# Patient Record
Sex: MALE | Race: WHITE | NOT HISPANIC OR LATINO | Employment: OTHER | URBAN - METROPOLITAN AREA
[De-identification: names, ages, dates, MRNs, and addresses within clinical notes are randomized per-mention and may not be internally consistent; named-entity substitution may affect disease eponyms.]

---

## 2017-05-04 ENCOUNTER — HOSPITAL ENCOUNTER (OUTPATIENT)
Dept: LAB | Facility: HOSPITAL | Age: 77
Discharge: HOME | End: 2017-05-04
Attending: INTERNAL MEDICINE | Admitting: INTERNAL MEDICINE

## 2017-05-04 LAB
MAGNESIUM SERPL-MCNC: 1.8 MG/DL (ref 1.8–2.5)
TSH SERPL-ACNC: 1.34 UIU/ML (ref 0.34–5.6)

## 2017-05-10 ENCOUNTER — HOSPITAL ENCOUNTER (OUTPATIENT)
Dept: CARDIOLOGY | Facility: HOSPITAL | Age: 77
Discharge: HOME | End: 2017-05-10
Attending: INTERNAL MEDICINE | Admitting: INTERNAL MEDICINE

## 2017-05-15 ENCOUNTER — HOSPITAL ENCOUNTER (OUTPATIENT)
Dept: PREADMISSION TESTING | Facility: HOSPITAL | Age: 77
Discharge: HOME | End: 2017-05-15
Attending: INTERNAL MEDICINE | Admitting: INTERNAL MEDICINE

## 2017-05-15 LAB
ANION GAP SERPL CALC-SCNC: 15.4 MMOL/L (ref 10–20)
BASOPHILS # BLD AUTO: 0.1 10*3/UL (ref 0–0.2)
BASOPHILS NFR BLD AUTO: 1 % (ref 0–2)
BUN SERPL-MCNC: 16 MG/DL (ref 8–20)
BUN/CREAT SERPL: 16 (ref 6.2–20.3)
CALCIUM SERPL-MCNC: 9.6 MG/DL (ref 8.9–10.3)
CHLORIDE SERPL-SCNC: 104 MMOL/L (ref 101–111)
CHOLEST SERPL-MCNC: 108 MG/DL
CHOLEST/HDLC SERPL: 2.7 {RATIO}
CONV CO2: 26 MMOL/L (ref 22–32)
CONV LDL CHOLESTEROL DIRECT: 39 MG/DL (ref 0–100)
CREAT UR-MCNC: 1 MG/DL (ref 0.7–1.2)
DIFFERENTIAL METHOD BLD: (no result)
EOSINOPHIL # BLD AUTO: 0.1 10*3/UL (ref 0–0.3)
EOSINOPHIL # BLD AUTO: 1 % (ref 0–3)
ERYTHROCYTE [DISTWIDTH] IN BLOOD BY AUTOMATED COUNT: 14.3 % (ref 11.5–14.5)
GLUCOSE SERPL-MCNC: 111 MG/DL (ref 65–99)
HCT VFR BLD AUTO: 42.4 % (ref 40–54)
HDLC SERPL-MCNC: 40 MG/DL
HGB BLD-MCNC: 14.3 G/DL (ref 14–18)
INR PPP: 1
LDLC/HDLC SERPL: 1 {RATIO}
LIPID INTERPRETATION: ABNORMAL
LYMPHOCYTES # BLD AUTO: 1.8 10*3/UL (ref 0.8–4.8)
LYMPHOCYTES NFR BLD AUTO: 29 % (ref 18–42)
MCH RBC QN AUTO: 32.1 PG (ref 26–32)
MCHC RBC AUTO-ENTMCNC: 33.8 G/DL (ref 32–36)
MCV RBC AUTO: 95 FL (ref 80–94)
MONOCYTES # BLD AUTO: 0.5 10*3/UL (ref 0.1–1.3)
MONOCYTES NFR BLD AUTO: 9 % (ref 2–11)
NEUTROPHILS # BLD AUTO: 3.7 10*3/UL (ref 2.3–8.6)
NEUTROPHILS NFR BLD AUTO: 60 % (ref 50–75)
NRBC BLD AUTO-RTO: 0 /100{WBCS}
NRBC/RBC NFR BLD MANUAL: 0 10*3/UL
PLATELET # BLD AUTO: 123 10*3/UL (ref 150–450)
PMV BLD AUTO: 9.9 FL (ref 7.4–10.4)
POTASSIUM SERPL-SCNC: 4.4 MMOL/L (ref 3.6–5.1)
PROTHROMBIN TIME: 11.7 SEC (ref 9.6–11.7)
RBC # BLD AUTO: 4.46 10*6/UL (ref 4.6–6)
SODIUM SERPL-SCNC: 141 MMOL/L (ref 136–144)
TRIGL SERPL-MCNC: 171 MG/DL
VLDLC SERPL CALC-MCNC: 28.7 MG/DL
WBC # BLD AUTO: 6.2 10*3/UL (ref 4.5–11.5)

## 2017-05-23 ENCOUNTER — HOSPITAL ENCOUNTER (OUTPATIENT)
Dept: OTHER | Facility: HOSPITAL | Age: 77
Discharge: HOME | End: 2017-05-23
Attending: INTERNAL MEDICINE | Admitting: INTERNAL MEDICINE

## 2017-05-23 LAB
ANION GAP SERPL CALC-SCNC: 14.1 MMOL/L (ref 10–20)
APTT BLD: 23.8 SEC (ref 24–31)
BACTERIA SPEC AEROBE CULT: NORMAL
BASOPHILS # BLD AUTO: 0.1 10*3/UL (ref 0–0.2)
BASOPHILS NFR BLD AUTO: 1 % (ref 0–2)
BUN SERPL-MCNC: 16 MG/DL (ref 8–20)
BUN/CREAT SERPL: 17.8 (ref 6.2–20.3)
CALCIUM SERPL-MCNC: 9.5 MG/DL (ref 8.9–10.3)
CHLORIDE SERPL-SCNC: 107 MMOL/L (ref 101–111)
CONV CO2: 26 MMOL/L (ref 22–32)
CREAT UR-MCNC: 0.9 MG/DL (ref 0.7–1.2)
DIFFERENTIAL METHOD BLD: (no result)
EOSINOPHIL # BLD AUTO: 0.1 10*3/UL (ref 0–0.3)
EOSINOPHIL # BLD AUTO: 2 % (ref 0–3)
ERYTHROCYTE [DISTWIDTH] IN BLOOD BY AUTOMATED COUNT: 14.2 % (ref 11.5–14.5)
GLUCOSE SERPL-MCNC: 106 MG/DL (ref 65–99)
HCT VFR BLD AUTO: 40.7 % (ref 40–54)
HGB BLD-MCNC: 13.8 G/DL (ref 14–18)
INR PPP: 1
LYMPHOCYTES # BLD AUTO: 1.5 10*3/UL (ref 0.8–4.8)
LYMPHOCYTES NFR BLD AUTO: 28 % (ref 18–42)
Lab: NORMAL
MCH RBC QN AUTO: 32 PG (ref 26–32)
MCHC RBC AUTO-ENTMCNC: 33.8 G/DL (ref 32–36)
MCV RBC AUTO: 94.8 FL (ref 80–94)
MICRO REPORT STATUS: NORMAL
MONOCYTES # BLD AUTO: 0.5 10*3/UL (ref 0.1–1.3)
MONOCYTES NFR BLD AUTO: 9 % (ref 2–11)
NEUTROPHILS # BLD AUTO: 3.3 10*3/UL (ref 2.3–8.6)
NEUTROPHILS NFR BLD AUTO: 60 % (ref 50–75)
NRBC BLD AUTO-RTO: 0 /100{WBCS}
NRBC/RBC NFR BLD MANUAL: 0 10*3/UL
PLATELET # BLD AUTO: 128 10*3/UL (ref 150–450)
PMV BLD AUTO: 9.9 FL (ref 7.4–10.4)
POTASSIUM SERPL-SCNC: 4.1 MMOL/L (ref 3.6–5.1)
PROTHROMBIN TIME: 11.4 SEC (ref 9.6–11.7)
RBC # BLD AUTO: 4.29 10*6/UL (ref 4.6–6)
SODIUM SERPL-SCNC: 143 MMOL/L (ref 136–144)
SPECIMEN SOURCE: NORMAL
WBC # BLD AUTO: 5.5 10*3/UL (ref 4.5–11.5)

## 2019-08-15 ENCOUNTER — CLINICAL SUPPORT NO REQUIREMENTS (OUTPATIENT)
Dept: CARDIOLOGY | Facility: CLINIC | Age: 79
End: 2019-08-15

## 2019-08-15 DIAGNOSIS — R00.1 BRADYCARDIA: Primary | ICD-10-CM

## 2019-08-15 DIAGNOSIS — Z95.0 PACEMAKER: ICD-10-CM

## 2019-08-22 PROCEDURE — 93296 REM INTERROG EVL PM/IDS: CPT | Performed by: INTERNAL MEDICINE

## 2019-08-22 PROCEDURE — 93294 REM INTERROG EVL PM/LDLS PM: CPT | Performed by: INTERNAL MEDICINE

## 2019-08-26 ENCOUNTER — TELEPHONE (OUTPATIENT)
Dept: CARDIOLOGY | Facility: CLINIC | Age: 79
End: 2019-08-26

## 2019-08-26 NOTE — TELEPHONE ENCOUNTER
Wants call from Rita, had a couple of episodes lately, almost passed out, b/p 158/90 on Saturday, wants Rita to check pacemaker around 3 pm and 8 pm.  Told her I would send message to Dr. Rodrigez's medical assistance to address the near passing out

## 2019-08-29 ENCOUNTER — CLINICAL SUPPORT NO REQUIREMENTS (OUTPATIENT)
Dept: CARDIOLOGY | Facility: CLINIC | Age: 79
End: 2019-08-29

## 2019-08-29 DIAGNOSIS — Z95.0 PACEMAKER: ICD-10-CM

## 2019-08-29 DIAGNOSIS — R00.1 BRADYCARDIA, UNSPECIFIED: Primary | ICD-10-CM

## 2019-08-29 NOTE — PROGRESS NOTES
8/29/19  Did not contact pt for this report.  Wife called earlier today-new transmission sent. Mariel reviewed/discussed new report earlier today.

## 2019-08-29 NOTE — TELEPHONE ENCOUNTER
Spoke to wife, patient had dizzy episodes about the time of the fast A/V rates on 8/24/2019. 16:27 14 sec 139 hr and 20:04 11 sec 140 hr. Recommended he drink plenty of fluids, may be due to slight dehydration. Continue to monitor, Dr Rodrigez to advise.

## 2019-08-29 NOTE — TELEPHONE ENCOUNTER
Call from wife stating transmitter will not complete cycle - keeps flashing.    Advised to contact [employer] at [phone]. Verbal understanding, but still wants Mariel to return call...

## 2019-09-11 RX ORDER — LEVOFLOXACIN 750 MG/1
TABLET ORAL
COMMUNITY
Start: 2019-07-18 | End: 2020-11-30

## 2019-09-11 RX ORDER — TAMSULOSIN HYDROCHLORIDE 0.4 MG/1
2 CAPSULE ORAL NIGHTLY
COMMUNITY
Start: 2019-08-23

## 2019-09-11 RX ORDER — SIMVASTATIN 40 MG
40 TABLET ORAL NIGHTLY
COMMUNITY
Start: 2019-08-11

## 2019-09-11 RX ORDER — FINASTERIDE 5 MG/1
5 TABLET, FILM COATED ORAL DAILY
COMMUNITY
Start: 2019-08-23

## 2019-09-11 RX ORDER — DOCUSATE SODIUM 100 MG/1
100 CAPSULE, LIQUID FILLED ORAL
COMMUNITY
Start: 2017-05-22

## 2019-09-11 RX ORDER — ASPIRIN 81 MG/1
TABLET ORAL
COMMUNITY
Start: 2015-09-10 | End: 2020-11-30

## 2019-09-12 ENCOUNTER — OFFICE VISIT (OUTPATIENT)
Dept: CARDIOLOGY | Facility: CLINIC | Age: 79
End: 2019-09-12

## 2019-09-12 VITALS
HEART RATE: 63 BPM | HEIGHT: 72 IN | WEIGHT: 205.5 LBS | BODY MASS INDEX: 27.83 KG/M2 | SYSTOLIC BLOOD PRESSURE: 131 MMHG | DIASTOLIC BLOOD PRESSURE: 83 MMHG | OXYGEN SATURATION: 100 %

## 2019-09-12 DIAGNOSIS — I49.3 PVC'S (PREMATURE VENTRICULAR CONTRACTIONS): ICD-10-CM

## 2019-09-12 DIAGNOSIS — R42 DIZZINESS: Primary | ICD-10-CM

## 2019-09-12 DIAGNOSIS — I10 ESSENTIAL HYPERTENSION: ICD-10-CM

## 2019-09-12 DIAGNOSIS — Z95.0 STATUS POST PLACEMENT OF CARDIAC PACEMAKER: ICD-10-CM

## 2019-09-12 DIAGNOSIS — E78.5 DYSLIPIDEMIA: ICD-10-CM

## 2019-09-12 PROCEDURE — 99214 OFFICE O/P EST MOD 30 MIN: CPT | Performed by: INTERNAL MEDICINE

## 2019-09-12 NOTE — PROGRESS NOTES
Encounter Date:09/12/2019  Last seen 5/8/2019      Patient ID: Herve Pérez Sr. is a 79 y.o. male.    Chief Complaint:  Acute visit-dizziness  Patient was last seen in the office in May 2019.  Recently patient had dizziness for couple days approximately 3 to 4 weeks ago.  Dizziness has improved.  Since I have last seen, the patient has been without any chest discomfort ,shortness of breath, palpitations, or syncope.  Denies having any headache ,abdominal pain ,nausea, vomiting , diarrhea constipation, loss of weight or loss of appetite.  Denies having any excessive bruising ,hematuria or blood in the stool.  Review of all systems negative except as indicated    History of Present Illness      Assessment and Plan       [[[[  Impression  ====  -status permanent dual-chamber pacemaker implantation (Medtronic MRI compatible) 05/24/2017.  Tachy-ruy syndrome     - premature ventricle contractions.-    -Cardiac catheterization 05/17/2017 revealed mild anterior wall hypokinesis and normal coronary arteries.  Ejection fraction 55-60%.    Echocardiogram 05/10/2017 revealed left atrial enlargement thickened aortic valve and proximal posterior wall hypokinesis.  Stress Cardiolite test is abnormal 05/10/2017 with poster lateral ischemia.  Patient had increased ventricular ectopy with couplets.    - strong family history of coronary artery disease     -dyslipidemia and hypertension    -mild lower extremity edema probably secondary to varicose veins     -former smoker     -allergy to Augmentin.  ====    Plan  ===  Dizziness couple of weeks back.  Dizziness has improved.  Patient is not having any angina pectoris or congestive heart failure.  Patient has premature ventricle contractions  continue Lopressor 25 mg  tablet half a tablet twice a day.  Followup in the office at her regularly scheduled appointment with pacemaker interrogation.  [[[[           Diagnosis Plan   1. Dizziness     2. Status post placement of cardiac  pacemaker     3. PVC's (premature ventricular contractions)     4. Essential hypertension     5. Dyslipidemia     LAB RESULTS (LAST 7 DAYS)    CBC        BMP        CMP         BNP        TROPONIN        CoAg        Creatinine Clearance  CrCl cannot be calculated (Patient's most recent lab result is older than the maximum 30 days allowed.).    ABG        Radiology  No radiology results for the last day                The following portions of the patient's history were reviewed and updated as appropriate: allergies, current medications, past family history, past medical history, past social history, past surgical history and problem list.    Review of Systems   Constitution: Positive for malaise/fatigue.   Cardiovascular: Negative for chest pain, leg swelling, palpitations and syncope.   Respiratory: Negative for shortness of breath.    Skin: Negative for rash.   Gastrointestinal: Negative for nausea and vomiting.   Neurological: Positive for dizziness and light-headedness. Negative for numbness.         Current Outpatient Medications:   •  apixaban (ELIQUIS) 5 MG tablet tablet, Every 12 (Twelve) Hours., Disp: , Rfl:   •  docusate sodium (CVS STOOL SOFTENER) 100 MG capsule, CVS STOOL SOFTENER 100 MG CAPS, Disp: , Rfl:   •  finasteride (PROSCAR) 5 MG tablet, , Disp: , Rfl:   •  metoprolol tartrate (LOPRESSOR) 25 MG tablet, , Disp: , Rfl:   •  simvastatin (ZOCOR) 40 MG tablet, , Disp: , Rfl:   •  tamsulosin (FLOMAX) 0.4 MG capsule 24 hr capsule, , Disp: , Rfl:   •  aspirin (ASPIR-LOW) 81 MG EC tablet, ASPIR-LOW 81 MG TBEC, Disp: , Rfl:   •  levoFLOXacin (LEVAQUIN) 750 MG tablet, , Disp: , Rfl:     Allergies   Allergen Reactions   • Amoxicillin-Pot Clavulanate Unknown (See Comments)       Family History   Problem Relation Age of Onset   • Heart disease Mother    • Heart disease Sister    • Heart disease Brother        Past Surgical History:   Procedure Laterality Date   • BACK SURGERY     • CARDIAC CATHETERIZATION    "  • CATARACT EXTRACTION     • PACEMAKER IMPLANTATION         Past Medical History:   Diagnosis Date   • Hyperlipidemia    • Hypertension        Family History   Problem Relation Age of Onset   • Heart disease Mother    • Heart disease Sister    • Heart disease Brother        Social History     Socioeconomic History   • Marital status:      Spouse name: Not on file   • Number of children: Not on file   • Years of education: Not on file   • Highest education level: Not on file   Tobacco Use   • Smoking status: Former Smoker   • Smokeless tobacco: Never Used   Substance and Sexual Activity   • Alcohol use: Yes     Comment: rare; beer         Procedures      Objective:       Physical Exam    /83 (BP Location: Left arm, Patient Position: Sitting, Cuff Size: Adult)   Pulse 63   Ht 181.6 cm (71.5\")   Wt 93.2 kg (205 lb 8 oz)   SpO2 100%   BMI 28.26 kg/m²   The patient is alert, oriented and in no distress.    Vital signs as noted above.    Head and neck revealed no carotid bruits or jugular venous distension.  No thyromegaly or lymphadenopathy is present.    Lungs clear.  No wheezing.  Breath sounds are normal bilaterally.    Heart normal first and second heart sounds.  No murmur..  No pericardial rub is present.  No gallop is present.    Abdomen soft and nontender.  No organomegaly is present.    Extremities revealed good peripheral pulses without any pedal edema.    Skin warm and dry.  Pacemaker site looks normal.    Musculoskeletal system is grossly normal.    CNS grossly normal.        "

## 2019-11-20 ENCOUNTER — OFFICE VISIT (OUTPATIENT)
Dept: CARDIOLOGY | Facility: CLINIC | Age: 79
End: 2019-11-20

## 2019-11-20 ENCOUNTER — CLINICAL SUPPORT NO REQUIREMENTS (OUTPATIENT)
Dept: CARDIOLOGY | Facility: CLINIC | Age: 79
End: 2019-11-20

## 2019-11-20 VITALS
OXYGEN SATURATION: 98 % | BODY MASS INDEX: 28.27 KG/M2 | DIASTOLIC BLOOD PRESSURE: 82 MMHG | SYSTOLIC BLOOD PRESSURE: 136 MMHG | HEIGHT: 72 IN | HEART RATE: 63 BPM | WEIGHT: 208.75 LBS

## 2019-11-20 DIAGNOSIS — I10 ESSENTIAL HYPERTENSION: ICD-10-CM

## 2019-11-20 DIAGNOSIS — E78.5 DYSLIPIDEMIA: ICD-10-CM

## 2019-11-20 DIAGNOSIS — Z95.0 STATUS POST PLACEMENT OF CARDIAC PACEMAKER: ICD-10-CM

## 2019-11-20 DIAGNOSIS — Z95.0 PACEMAKER: Primary | ICD-10-CM

## 2019-11-20 DIAGNOSIS — R00.1 BRADYCARDIA, SINUS: ICD-10-CM

## 2019-11-20 DIAGNOSIS — R00.1 BRADYCARDIA, SINUS: Primary | ICD-10-CM

## 2019-11-20 DIAGNOSIS — I48.0 PAROXYSMAL ATRIAL FIBRILLATION (HCC): ICD-10-CM

## 2019-11-20 PROCEDURE — 99214 OFFICE O/P EST MOD 30 MIN: CPT | Performed by: INTERNAL MEDICINE

## 2019-11-20 PROCEDURE — 93280 PM DEVICE PROGR EVAL DUAL: CPT | Performed by: INTERNAL MEDICINE

## 2019-11-20 RX ORDER — ALLOPURINOL 300 MG/1
300 TABLET ORAL DAILY
COMMUNITY
Start: 2019-11-09

## 2019-11-20 NOTE — PROGRESS NOTES
Encounter Date:11/20/2019  Last seen 9/12/2019      Patient ID: Herve Pérez Sr. is a 79 y.o. male.    Chief Complaint:  Status post pacemaker  Premature ventricle contractions  Hypertension  Dyslipidemia  Paroxysmal atrial fibrillation      History of Present Illness  Since I have last seen, the patient has been without any chest discomfort ,shortness of breath, palpitations, dizziness or syncope.  Denies having any headache ,abdominal pain ,nausea, vomiting , diarrhea constipation, loss of weight or loss of appetite.  Denies having any excessive bruising ,hematuria or blood in the stool.    Review of all systems negative except as indicated    Assessment and Plan         [[[[  Impression  ====  -status permanent dual-chamber pacemaker implantation (Medtronic MRI compatible) 05/24/2017.  Tachy-ruy syndrome    -Rare paroxysmal atrial fibrillation     - premature ventricle contractions.-     -Cardiac catheterization 05/17/2017 revealed mild anterior wall hypokinesis and normal coronary arteries.  Ejection fraction 55-60%.     Echocardiogram 05/10/2017 revealed left atrial enlargement thickened aortic valve and proximal posterior wall hypokinesis.  Stress Cardiolite test is abnormal 05/10/2017 with poster lateral ischemia.  Patient had increased ventricular ectopy with couplets.     - strong family history of coronary artery disease      -dyslipidemia and hypertension     -mild lower extremity edema probably secondary to varicose veins      -former smoker      -allergy to Augmentin.  ====    Plan  ===  Interrogation of the pacemaker reveal excellent pacing parameters.  Rare episodes of paroxysmal atrial fibrillation was noted lasting mostly less than 1 minute and occurring rarely.  Patient is not having any angina pectoris or congestive heart failure.  Patient has premature ventricle contractions  continue Lopressor 25 mg  tablet half a tablet twice a day.  Option of anticoagulation was discussed with  patient.  Since the atrial fibrillation is extremely rare and short living the risk of anti-correlation was thought to be higher and this was discussed with patient.  Followup in the office  in 6 months with pacemaker interrogation.  Further plan will depend on patient's progress.  [[[[         Diagnosis Plan   1. Bradycardia, sinus     2. Paroxysmal atrial fibrillation (CMS/HCC)     3. Dyslipidemia     4. Essential hypertension     5. Status post placement of cardiac pacemaker     LAB RESULTS (LAST 7 DAYS)    CBC        BMP        CMP         BNP        TROPONIN        CoAg        Creatinine Clearance  CrCl cannot be calculated (Patient's most recent lab result is older than the maximum 30 days allowed.).    ABG        Radiology  No radiology results for the last day                The following portions of the patient's history were reviewed and updated as appropriate: allergies, current medications, past family history, past medical history, past social history, past surgical history and problem list.    Review of Systems   Constitution: Negative for malaise/fatigue.   Cardiovascular: Negative for chest pain, palpitations and syncope. Leg swelling: ankles at times.   Respiratory: Shortness of breath: at times.    Skin: Negative for rash.   Gastrointestinal: Negative for nausea and vomiting.   Neurological: Negative for numbness. Light-headedness: at times.         Current Outpatient Medications:   •  allopurinol (ZYLOPRIM) 300 MG tablet, , Disp: , Rfl:   •  apixaban (ELIQUIS) 5 MG tablet tablet, Every 12 (Twelve) Hours., Disp: , Rfl:   •  aspirin (ASPIR-LOW) 81 MG EC tablet, ASPIR-LOW 81 MG TBEC, Disp: , Rfl:   •  docusate sodium (CVS STOOL SOFTENER) 100 MG capsule, CVS STOOL SOFTENER 100 MG CAPS, Disp: , Rfl:   •  finasteride (PROSCAR) 5 MG tablet, , Disp: , Rfl:   •  levoFLOXacin (LEVAQUIN) 750 MG tablet, , Disp: , Rfl:   •  metoprolol tartrate (LOPRESSOR) 25 MG tablet, , Disp: , Rfl:   •  simvastatin (ZOCOR)  "40 MG tablet, , Disp: , Rfl:   •  tamsulosin (FLOMAX) 0.4 MG capsule 24 hr capsule, , Disp: , Rfl:     Allergies   Allergen Reactions   • Amoxicillin-Pot Clavulanate Unknown (See Comments)       Family History   Problem Relation Age of Onset   • Heart disease Mother    • Heart disease Sister    • Heart disease Brother        Past Surgical History:   Procedure Laterality Date   • BACK SURGERY     • CARDIAC CATHETERIZATION     • CATARACT EXTRACTION     • PACEMAKER IMPLANTATION         Past Medical History:   Diagnosis Date   • Hyperlipidemia    • Hypertension        Family History   Problem Relation Age of Onset   • Heart disease Mother    • Heart disease Sister    • Heart disease Brother        Social History     Socioeconomic History   • Marital status:      Spouse name: Not on file   • Number of children: Not on file   • Years of education: Not on file   • Highest education level: Not on file   Tobacco Use   • Smoking status: Former Smoker   • Smokeless tobacco: Never Used   Substance and Sexual Activity   • Alcohol use: Yes     Comment: rare; beer         Procedures      Objective:       Physical Exam    /82   Pulse 63   Ht 181.6 cm (71.5\")   Wt 94.7 kg (208 lb 12 oz)   SpO2 98%   BMI 28.71 kg/m²   The patient is alert, oriented and in no distress.    Vital signs as noted above.    Head and neck revealed no carotid bruits or jugular venous distension.  No thyromegaly or lymphadenopathy is present.    Lungs clear.  No wheezing.  Breath sounds are normal bilaterally.    Heart normal first and second heart sounds.  No murmur..  No pericardial rub is present.  No gallop is present.    Abdomen soft and nontender.  No organomegaly is present.    Extremities revealed good peripheral pulses without any pedal edema.    Skin warm and dry.  His medical site looks normal.    Musculoskeletal system is grossly normal.    CNS grossly normal.        "

## 2020-02-26 ENCOUNTER — CLINICAL SUPPORT NO REQUIREMENTS (OUTPATIENT)
Dept: CARDIOLOGY | Facility: CLINIC | Age: 80
End: 2020-02-26

## 2020-02-26 DIAGNOSIS — I48.0 PAROXYSMAL ATRIAL FIBRILLATION (HCC): ICD-10-CM

## 2020-02-26 DIAGNOSIS — Z95.0 PACEMAKER: Primary | ICD-10-CM

## 2020-02-26 DIAGNOSIS — R00.1 BRADYCARDIA, SINUS: ICD-10-CM

## 2020-02-26 PROCEDURE — 93296 REM INTERROG EVL PM/IDS: CPT | Performed by: INTERNAL MEDICINE

## 2020-02-26 PROCEDURE — 93294 REM INTERROG EVL PM/LDLS PM: CPT | Performed by: INTERNAL MEDICINE

## 2020-06-03 ENCOUNTER — OFFICE VISIT (OUTPATIENT)
Dept: CARDIOLOGY | Facility: CLINIC | Age: 80
End: 2020-06-03

## 2020-06-03 ENCOUNTER — CLINICAL SUPPORT NO REQUIREMENTS (OUTPATIENT)
Dept: CARDIOLOGY | Facility: CLINIC | Age: 80
End: 2020-06-03

## 2020-06-03 VITALS
OXYGEN SATURATION: 98 % | DIASTOLIC BLOOD PRESSURE: 80 MMHG | SYSTOLIC BLOOD PRESSURE: 148 MMHG | BODY MASS INDEX: 27.64 KG/M2 | WEIGHT: 201 LBS | HEART RATE: 62 BPM

## 2020-06-03 DIAGNOSIS — R00.1 BRADYCARDIA, SINUS: ICD-10-CM

## 2020-06-03 DIAGNOSIS — I10 ESSENTIAL HYPERTENSION: ICD-10-CM

## 2020-06-03 DIAGNOSIS — I49.3 PVC'S (PREMATURE VENTRICULAR CONTRACTIONS): ICD-10-CM

## 2020-06-03 DIAGNOSIS — E78.5 DYSLIPIDEMIA: ICD-10-CM

## 2020-06-03 DIAGNOSIS — I48.0 PAROXYSMAL ATRIAL FIBRILLATION (HCC): ICD-10-CM

## 2020-06-03 DIAGNOSIS — Z95.0 PACEMAKER: ICD-10-CM

## 2020-06-03 DIAGNOSIS — Z95.0 PACEMAKER: Primary | ICD-10-CM

## 2020-06-03 DIAGNOSIS — Z95.0 STATUS POST PLACEMENT OF CARDIAC PACEMAKER: ICD-10-CM

## 2020-06-03 PROCEDURE — 99214 OFFICE O/P EST MOD 30 MIN: CPT | Performed by: INTERNAL MEDICINE

## 2020-06-03 PROCEDURE — 93000 ELECTROCARDIOGRAM COMPLETE: CPT | Performed by: INTERNAL MEDICINE

## 2020-06-03 PROCEDURE — 93280 PM DEVICE PROGR EVAL DUAL: CPT | Performed by: INTERNAL MEDICINE

## 2020-06-03 NOTE — PROGRESS NOTES
Encounter Date:06/03/2020  Last seen 11/20/2019      Patient ID: Herve Pérez Sr. is a 79 y.o. male.    Chief Complaint:    Status post pacemaker  Premature ventricle contractions  Hypertension  Dyslipidemia  Paroxysmal atrial fibrillation  Anticoagulation management        History of Present Illness  Since I have last seen, the patient has been without any chest discomfort ,shortness of breath, palpitations, dizziness or syncope.  Denies having any headache ,abdominal pain ,nausea, vomiting , diarrhea constipation, loss of weight or loss of appetite.  Denies having any excessive bruising ,hematuria or blood in the stool.     Review of all systems negative except as indicated     Assessment and Plan            [[[[  Impression  ====  -status permanent dual-chamber pacemaker implantation (Medtronic MRI compatible) 05/24/2017.  Tachy-ruy syndrome     -Rare paroxysmal atrial fibrillation      - premature ventricle contractions.-     -Cardiac catheterization 05/17/2017 revealed mild anterior wall hypokinesis and normal coronary arteries.  Ejection fraction 55-60%.     Echocardiogram 05/10/2017 revealed left atrial enlargement thickened aortic valve and proximal posterior wall hypokinesis.  Stress Cardiolite test is abnormal 05/10/2017 with poster lateral ischemia.  Patient had increased ventricular ectopy with couplets.     - strong family history of coronary artery disease      -dyslipidemia and hypertension     -mild lower extremity edema probably secondary to varicose veins      -former smoker      -allergy to Augmentin.  ====    Plan  ===  Interrogation of the pacemaker reveal excellent pacing parameters.  Rare episodes of paroxysmal atrial fibrillation was noted lasting mostly less than 1 minute and occurring rarely.  Patient is not having any angina pectoris or congestive heart failure.  Patient has premature ventricle contractions  continue Lopressor 25 mg  tablet half a tablet twice a day.  Anticoagulation  status was reviewed.  Continue Eliquis 5 mg twice a day  EKG showed atrial paced ventricular sensed rhythm  Followup in the office  in 6 months with pacemaker interrogation.  Further plan will depend on patient's progress.  [[[[                Diagnosis Plan   1. Pacemaker     2. Bradycardia, sinus  ECG 12 Lead   3. Essential hypertension  ECG 12 Lead   4. Status post placement of cardiac pacemaker  ECG 12 Lead   5. PVC's (premature ventricular contractions)  ECG 12 Lead   6. Dyslipidemia     LAB RESULTS (LAST 7 DAYS)    CBC        BMP        CMP         BNP        TROPONIN        CoAg        Creatinine Clearance  CrCl cannot be calculated (Patient's most recent lab result is older than the maximum 30 days allowed.).    ABG        Radiology  No radiology results for the last day                The following portions of the patient's history were reviewed and updated as appropriate: allergies, current medications, past family history, past medical history, past social history, past surgical history and problem list.    Review of Systems   Constitution: Positive for malaise/fatigue. Negative for fever.   HENT: Negative for congestion and hearing loss.    Eyes: Negative for double vision and visual disturbance.   Cardiovascular: Positive for palpitations. Negative for chest pain, claudication, dyspnea on exertion, leg swelling and syncope.   Respiratory: Negative for cough and shortness of breath.    Endocrine: Negative for cold intolerance.   Skin: Negative for color change and rash.   Musculoskeletal: Negative for arthritis and joint pain.   Gastrointestinal: Negative for abdominal pain and heartburn.   Genitourinary: Negative for hematuria.   Neurological: Negative for excessive daytime sleepiness and dizziness.   Psychiatric/Behavioral: Negative for depression. The patient is not nervous/anxious.    All other systems reviewed and are negative.        Current Outpatient Medications:   •  allopurinol (ZYLOPRIM) 300  MG tablet, , Disp: , Rfl:   •  apixaban (ELIQUIS) 5 MG tablet tablet, Every 12 (Twelve) Hours., Disp: , Rfl:   •  docusate sodium (CVS STOOL SOFTENER) 100 MG capsule, CVS STOOL SOFTENER 100 MG CAPS, Disp: , Rfl:   •  finasteride (PROSCAR) 5 MG tablet, , Disp: , Rfl:   •  metoprolol tartrate (LOPRESSOR) 25 MG tablet, Take 12.5 mg by mouth 2 (Two) Times a Day., Disp: , Rfl:   •  simvastatin (ZOCOR) 40 MG tablet, , Disp: , Rfl:   •  tamsulosin (FLOMAX) 0.4 MG capsule 24 hr capsule, , Disp: , Rfl:   •  aspirin (ASPIR-LOW) 81 MG EC tablet, ASPIR-LOW 81 MG TBEC, Disp: , Rfl:   •  levoFLOXacin (LEVAQUIN) 750 MG tablet, , Disp: , Rfl:     Allergies   Allergen Reactions   • Amoxicillin-Pot Clavulanate Unknown (See Comments)       Family History   Problem Relation Age of Onset   • Heart disease Mother    • Heart disease Sister    • Heart disease Brother        Past Surgical History:   Procedure Laterality Date   • BACK SURGERY     • CARDIAC CATHETERIZATION     • CATARACT EXTRACTION     • PACEMAKER IMPLANTATION         Past Medical History:   Diagnosis Date   • Hyperlipidemia    • Hypertension        Family History   Problem Relation Age of Onset   • Heart disease Mother    • Heart disease Sister    • Heart disease Brother        Social History     Socioeconomic History   • Marital status:      Spouse name: Not on file   • Number of children: Not on file   • Years of education: Not on file   • Highest education level: Not on file   Tobacco Use   • Smoking status: Former Smoker   • Smokeless tobacco: Never Used   Substance and Sexual Activity   • Alcohol use: Yes     Comment: rare; beer           ECG 12 Lead  Date/Time: 6/3/2020 2:27 PM  Performed by: Cesar Rodrigez MD  Authorized by: Cesar Rodrigez MD   Comparison: compared with previous ECG   Similar to previous ECG  Comments: Atrial paced ventricular sensed rhythm 69/min nonspecific ST-T wave changes normal axis normal intervals no ectopy no change from  5/25/2017              Objective:       Physical Exam    /80   Pulse 62   Wt 91.2 kg (201 lb)   SpO2 98%   BMI 27.64 kg/m²   The patient is alert, oriented and in no distress.    Vital signs as noted above.    Head and neck revealed no carotid bruits or jugular venous distension.  No thyromegaly or lymphadenopathy is present.    Lungs clear.  No wheezing.  Breath sounds are normal bilaterally.    Heart normal first and second heart sounds.  No murmur..  No pericardial rub is present.  No gallop is present.    Abdomen soft and nontender.  No organomegaly is present.    Extremities revealed good peripheral pulses without any pedal edema.    Skin warm and dry.  Pacemaker site looks normal.    Musculoskeletal system is grossly normal.    CNS grossly normal.

## 2020-09-15 ENCOUNTER — TELEPHONE (OUTPATIENT)
Dept: CARDIOLOGY | Facility: CLINIC | Age: 80
End: 2020-09-15

## 2020-09-17 ENCOUNTER — TELEPHONE (OUTPATIENT)
Dept: CARDIOLOGY | Facility: CLINIC | Age: 80
End: 2020-09-17

## 2020-09-17 NOTE — TELEPHONE ENCOUNTER
Patient wife called- confirmed HIPPA advised per Device report patient has 6years battery life left. There was some non-sustained episodes of Tachacardia but this is not new. Patient rec'd the new Medtronic transmitter and is going to send a manual report tomorrow to confirm it is working. Advised if wife transmits report and has not heard from our office by 4pm to give us a call. [employer] customer service number given in case patient needs assistance with transmitting report. Pt wife verbalizes understanding apLPN

## 2020-09-18 ENCOUNTER — TELEPHONE (OUTPATIENT)
Dept: CARDIOLOGY | Facility: CLINIC | Age: 80
End: 2020-09-18

## 2020-09-18 NOTE — TELEPHONE ENCOUNTER
I can see him at a regular appointment unless she is having problems.  If he he is having problems I can see him earlier.

## 2020-09-18 NOTE — TELEPHONE ENCOUNTER
Dr Ritter ordered Echo/CTA and labs done at [facility], these tests done on 8/12/20 and scanned in chart under Media tab. Please review, does patient need to come in before Dec apt? Having some swelling in his feet, some days worse than others. Also SOA with some activity, varies as well. Thanks

## 2020-09-21 NOTE — TELEPHONE ENCOUNTER
Left message on home number, patient may wait until December to be seen. If patient is SOA or has CP, please call office and move apt up. Appears to be stable at this time, but may move apt up if needed.

## 2020-11-30 ENCOUNTER — CLINICAL SUPPORT NO REQUIREMENTS (OUTPATIENT)
Dept: CARDIOLOGY | Facility: CLINIC | Age: 80
End: 2020-11-30

## 2020-11-30 ENCOUNTER — OFFICE VISIT (OUTPATIENT)
Dept: CARDIOLOGY | Facility: CLINIC | Age: 80
End: 2020-11-30

## 2020-11-30 VITALS
WEIGHT: 199 LBS | SYSTOLIC BLOOD PRESSURE: 148 MMHG | HEIGHT: 71 IN | DIASTOLIC BLOOD PRESSURE: 82 MMHG | HEART RATE: 60 BPM | TEMPERATURE: 96.9 F | OXYGEN SATURATION: 99 % | BODY MASS INDEX: 27.86 KG/M2

## 2020-11-30 DIAGNOSIS — E78.5 DYSLIPIDEMIA: ICD-10-CM

## 2020-11-30 DIAGNOSIS — R00.1 BRADYCARDIA, SINUS: ICD-10-CM

## 2020-11-30 DIAGNOSIS — I48.0 PAROXYSMAL ATRIAL FIBRILLATION (HCC): ICD-10-CM

## 2020-11-30 DIAGNOSIS — Z95.0 PACEMAKER: Primary | ICD-10-CM

## 2020-11-30 DIAGNOSIS — I10 ESSENTIAL HYPERTENSION: ICD-10-CM

## 2020-11-30 PROCEDURE — 93280 PM DEVICE PROGR EVAL DUAL: CPT | Performed by: INTERNAL MEDICINE

## 2020-11-30 PROCEDURE — 99214 OFFICE O/P EST MOD 30 MIN: CPT | Performed by: INTERNAL MEDICINE

## 2020-11-30 RX ORDER — UBIDECARENONE 75 MG
50 CAPSULE ORAL DAILY
COMMUNITY

## 2020-11-30 NOTE — PROGRESS NOTES
Encounter Date:11/30/2020  Last seen 6/3/2020      Patient ID: Herve Pérez Sr. is a 80 y.o. male.    Chief Complaint:    Status post pacemaker  Premature ventricle contractions  Hypertension  Dyslipidemia  Paroxysmal atrial fibrillation  Anticoagulation management        History of Present Illness  Mild lower extremity edema.    Since I have last seen, the patient has been without any chest discomfort ,shortness of breath, palpitations, dizziness or syncope.  Denies having any headache ,abdominal pain ,nausea, vomiting , diarrhea constipation, loss of weight or loss of appetite.  Denies having any excessive bruising ,hematuria or blood in the stool.    Review of all systems negative except as indicated.    Reviewed ROS.  Assessment and Plan            [[[[  Impression  ====  -status permanent dual-chamber pacemaker implantation (Medtronic MRI compatible) 05/24/2017.  Tachy-ruy syndrome     -Rare paroxysmal atrial fibrillation      - premature ventricle contractions.-     -Cardiac catheterization 05/17/2017 revealed mild anterior wall hypokinesis and normal coronary arteries.  Ejection fraction 55-60%.    Echocardiogram 8/12/2020 performed at [facility] was reviewed and showed ejection fraction of 40%.  Moderate left atrial enlargement was present.    Echocardiogram 05/10/2017 revealed left atrial enlargement thickened aortic valve and proximal posterior wall hypokinesis.  Stress Cardiolite test is abnormal 05/10/2017 with poster lateral ischemia.  Patient had increased ventricular ectopy with couplets.     - strong family history of coronary artery disease      -dyslipidemia and hypertension     -mild lower extremity edema probably secondary to varicose veins      -former smoker      -allergy to Augmentin.  ====    Plan  ===  Patient has mild lower extremity edema.  Echocardiogram 8/12/2020 performed at [facility] was reviewed and showed ejection fraction of 40%.  Moderate left atrial  enlargement was present.  Interrogation of the pacemaker reveal excellent pacing parameters.  Patient is not having any angina pectoris or congestive heart failure.  Patient has premature ventricle contractions  continue Lopressor 25 mg  tablet half a tablet twice a day.  Anticoagulation status was reviewed.  Continue Eliquis 5 mg twice a day  Patient was started on Lasix 20 mg once a day to be taken on a as needed basis.  Patient was educated regarding the echocardiographic findings.  Followup in the office  in 6 months with pacemaker interrogation.  Further plan will depend on patient's progress.  [[[[                 Diagnosis Plan   1. Pacemaker     2. Bradycardia, sinus     3. Paroxysmal atrial fibrillation (CMS/HCC)     4. Dyslipidemia     5. Essential hypertension     LAB RESULTS (LAST 7 DAYS)    CBC        BMP        CMP         BNP        TROPONIN        CoAg        Creatinine Clearance  CrCl cannot be calculated (Patient's most recent lab result is older than the maximum 30 days allowed.).    ABG        Radiology  No radiology results for the last day                The following portions of the patient's history were reviewed and updated as appropriate: allergies, current medications, past family history, past medical history, past social history, past surgical history and problem list.    Review of Systems   Constitution: Negative for malaise/fatigue.   Cardiovascular: Positive for leg swelling and palpitations. Negative for chest pain and syncope.   Respiratory: Negative for shortness of breath.    Skin: Negative for rash.   Gastrointestinal: Negative for nausea and vomiting.   Neurological: Negative for dizziness, light-headedness and numbness.         Current Outpatient Medications:   •  allopurinol (ZYLOPRIM) 300 MG tablet, , Disp: , Rfl:   •  apixaban (ELIQUIS) 5 MG tablet tablet, Every 12 (Twelve) Hours., Disp: , Rfl:   •  Cholecalciferol (vitamin D3) 125 MCG (5000 UT) capsule capsule, Take 5,000  "Units by mouth Daily., Disp: , Rfl:   •  docusate sodium (CVS STOOL SOFTENER) 100 MG capsule, CVS STOOL SOFTENER 100 MG CAPS, Disp: , Rfl:   •  finasteride (PROSCAR) 5 MG tablet, , Disp: , Rfl:   •  metoprolol tartrate (LOPRESSOR) 25 MG tablet, Take 12.5 mg by mouth 2 (Two) Times a Day., Disp: , Rfl:   •  simvastatin (ZOCOR) 40 MG tablet, , Disp: , Rfl:   •  tamsulosin (FLOMAX) 0.4 MG capsule 24 hr capsule, , Disp: , Rfl:   •  vitamin B-12 (CYANOCOBALAMIN) 100 MCG tablet, Take 50 mcg by mouth Daily., Disp: , Rfl:     Allergies   Allergen Reactions   • Amoxicillin-Pot Clavulanate Unknown (See Comments)       Family History   Problem Relation Age of Onset   • Heart disease Mother    • Heart disease Sister    • Heart disease Brother        Past Surgical History:   Procedure Laterality Date   • BACK SURGERY     • CARDIAC CATHETERIZATION     • CATARACT EXTRACTION     • PACEMAKER IMPLANTATION         Past Medical History:   Diagnosis Date   • Hyperlipidemia    • Hypertension        Family History   Problem Relation Age of Onset   • Heart disease Mother    • Heart disease Sister    • Heart disease Brother        Social History     Socioeconomic History   • Marital status:      Spouse name: Not on file   • Number of children: Not on file   • Years of education: Not on file   • Highest education level: Not on file   Tobacco Use   • Smoking status: Former Smoker   • Smokeless tobacco: Never Used   Substance and Sexual Activity   • Alcohol use: Yes     Comment: rare; beer         Procedures      Objective:       Physical Exam    /82 (BP Location: Right arm, Patient Position: Sitting, Cuff Size: Large Adult)   Pulse 60   Temp 96.9 °F (36.1 °C)   Ht 180.3 cm (71\")   Wt 90.3 kg (199 lb)   SpO2 99%   BMI 27.75 kg/m²   The patient is alert, oriented and in no distress.    Vital signs as noted above.    Head and neck revealed no carotid bruits or jugular venous distension.  No thyromegaly or lymphadenopathy is " present.    Lungs clear.  No wheezing.  Breath sounds are normal bilaterally.    Heart normal first and second heart sounds.  No murmur..  No pericardial rub is present.  No gallop is present.    Abdomen soft and nontender.  No organomegaly is present.    Extremities revealed good peripheral pulses.  1+ edema    Skin warm and dry.    Musculoskeletal system is grossly normal.    CNS grossly normal.

## 2021-01-01 ENCOUNTER — TELEPHONE (OUTPATIENT)
Dept: CARDIOLOGY | Facility: CLINIC | Age: 81
End: 2021-01-01

## 2021-01-01 ENCOUNTER — OFFICE VISIT (OUTPATIENT)
Dept: CARDIOLOGY | Facility: CLINIC | Age: 81
End: 2021-01-01

## 2021-01-01 ENCOUNTER — HOSPITAL ENCOUNTER (OUTPATIENT)
Dept: GENERAL RADIOLOGY | Facility: HOSPITAL | Age: 81
Discharge: HOME | End: 2021-11-04
Admitting: INTERNAL MEDICINE

## 2021-01-01 ENCOUNTER — HOME CARE VISIT (OUTPATIENT)
Dept: HOME HEALTH SERVICES | Facility: HOME HEALTHCARE | Age: 81
End: 2021-01-01

## 2021-01-01 ENCOUNTER — HOME HEALTH ADMISSION (OUTPATIENT)
Dept: HOME HEALTH SERVICES | Facility: HOME HEALTHCARE | Age: 81
End: 2021-01-01

## 2021-01-01 ENCOUNTER — LAB (OUTPATIENT)
Dept: LAB | Facility: HOSPITAL | Age: 81
End: 2021-01-01

## 2021-01-01 ENCOUNTER — TRANSCRIBE ORDERS (OUTPATIENT)
Dept: HOME HEALTH SERVICES | Facility: HOME HEALTHCARE | Age: 81
End: 2021-01-01

## 2021-01-01 ENCOUNTER — ANESTHESIA EVENT (OUTPATIENT)
Dept: GASTROENTEROLOGY | Facility: HOSPITAL | Age: 81
End: 2021-01-01

## 2021-01-01 ENCOUNTER — ANESTHESIA (OUTPATIENT)
Dept: GASTROENTEROLOGY | Facility: HOSPITAL | Age: 81
End: 2021-01-01

## 2021-01-01 ENCOUNTER — HOSPITAL ENCOUNTER (OUTPATIENT)
Facility: HOSPITAL | Age: 81
Setting detail: HOSPITAL OUTPATIENT SURGERY
Discharge: HOME | End: 2021-10-13
Attending: INTERNAL MEDICINE | Admitting: INTERNAL MEDICINE

## 2021-01-01 ENCOUNTER — HOSPITAL ENCOUNTER (OUTPATIENT)
Dept: CARDIOLOGY | Facility: HOSPITAL | Age: 81
Discharge: HOME | End: 2021-06-14

## 2021-01-01 ENCOUNTER — CLINICAL SUPPORT NO REQUIREMENTS (OUTPATIENT)
Dept: CARDIOLOGY | Facility: CLINIC | Age: 81
End: 2021-01-01

## 2021-01-01 ENCOUNTER — FLU SHOT (OUTPATIENT)
Dept: FAMILY MEDICINE CLINIC | Facility: CLINIC | Age: 81
End: 2021-01-01

## 2021-01-01 ENCOUNTER — TRANSCRIBE ORDERS (OUTPATIENT)
Dept: ADMINISTRATIVE | Facility: HOSPITAL | Age: 81
End: 2021-01-01

## 2021-01-01 VITALS
DIASTOLIC BLOOD PRESSURE: 66 MMHG | RESPIRATION RATE: 18 BRPM | TEMPERATURE: 98.3 F | SYSTOLIC BLOOD PRESSURE: 93 MMHG | OXYGEN SATURATION: 96 % | HEART RATE: 67 BPM

## 2021-01-01 VITALS
BODY MASS INDEX: 25.9 KG/M2 | DIASTOLIC BLOOD PRESSURE: 80 MMHG | HEIGHT: 71 IN | WEIGHT: 185 LBS | HEART RATE: 68 BPM | SYSTOLIC BLOOD PRESSURE: 134 MMHG

## 2021-01-01 VITALS
OXYGEN SATURATION: 100 % | BODY MASS INDEX: 25.12 KG/M2 | DIASTOLIC BLOOD PRESSURE: 65 MMHG | HEIGHT: 71 IN | RESPIRATION RATE: 16 BRPM | TEMPERATURE: 97.9 F | WEIGHT: 179.45 LBS | SYSTOLIC BLOOD PRESSURE: 113 MMHG | HEART RATE: 61 BPM

## 2021-01-01 VITALS
OXYGEN SATURATION: 96 % | HEART RATE: 67 BPM | DIASTOLIC BLOOD PRESSURE: 82 MMHG | RESPIRATION RATE: 18 BRPM | SYSTOLIC BLOOD PRESSURE: 132 MMHG

## 2021-01-01 VITALS
OXYGEN SATURATION: 98 % | RESPIRATION RATE: 18 BRPM | HEART RATE: 65 BPM | TEMPERATURE: 97.7 F | SYSTOLIC BLOOD PRESSURE: 131 MMHG | DIASTOLIC BLOOD PRESSURE: 70 MMHG

## 2021-01-01 VITALS
OXYGEN SATURATION: 99 % | HEART RATE: 81 BPM | BODY MASS INDEX: 23.94 KG/M2 | SYSTOLIC BLOOD PRESSURE: 112 MMHG | DIASTOLIC BLOOD PRESSURE: 72 MMHG | HEIGHT: 71 IN | WEIGHT: 171 LBS

## 2021-01-01 VITALS
DIASTOLIC BLOOD PRESSURE: 74 MMHG | OXYGEN SATURATION: 98 % | HEART RATE: 64 BPM | SYSTOLIC BLOOD PRESSURE: 125 MMHG | TEMPERATURE: 97.7 F | RESPIRATION RATE: 18 BRPM

## 2021-01-01 VITALS
SYSTOLIC BLOOD PRESSURE: 134 MMHG | WEIGHT: 199 LBS | BODY MASS INDEX: 27.86 KG/M2 | DIASTOLIC BLOOD PRESSURE: 80 MMHG | HEIGHT: 71 IN

## 2021-01-01 DIAGNOSIS — Z95.0 PACEMAKER: Primary | ICD-10-CM

## 2021-01-01 DIAGNOSIS — I10 ESSENTIAL HYPERTENSION: ICD-10-CM

## 2021-01-01 DIAGNOSIS — R13.10 DYSPHAGIA, UNSPECIFIED TYPE: Primary | ICD-10-CM

## 2021-01-01 DIAGNOSIS — R13.10 DYSPHAGIA, UNSPECIFIED TYPE: ICD-10-CM

## 2021-01-01 DIAGNOSIS — T18.120D: Primary | ICD-10-CM

## 2021-01-01 DIAGNOSIS — I48.0 PAROXYSMAL ATRIAL FIBRILLATION (HCC): ICD-10-CM

## 2021-01-01 DIAGNOSIS — R00.1 BRADYCARDIA, SINUS: ICD-10-CM

## 2021-01-01 DIAGNOSIS — I49.3 PVC'S (PREMATURE VENTRICULAR CONTRACTIONS): ICD-10-CM

## 2021-01-01 DIAGNOSIS — E78.5 DYSLIPIDEMIA: ICD-10-CM

## 2021-01-01 DIAGNOSIS — Z95.0 PACEMAKER: ICD-10-CM

## 2021-01-01 DIAGNOSIS — Z95.0 STATUS POST PLACEMENT OF CARDIAC PACEMAKER: ICD-10-CM

## 2021-01-01 DIAGNOSIS — Z23 NEED FOR INFLUENZA VACCINATION: Primary | ICD-10-CM

## 2021-01-01 LAB
BH CV ECHO MEAS - ACS: 2.1 CM
BH CV ECHO MEAS - AI DEC SLOPE: 231.2 CM/SEC^2
BH CV ECHO MEAS - AI DEC TIME: 1.6 SEC
BH CV ECHO MEAS - AI MAX PG: 51 MMHG
BH CV ECHO MEAS - AI MAX VEL: 356.7 CM/SEC
BH CV ECHO MEAS - AI P1/2T: 452 MSEC
BH CV ECHO MEAS - AO MAX PG (FULL): 4 MMHG
BH CV ECHO MEAS - AO MAX PG: 6.2 MMHG
BH CV ECHO MEAS - AO MEAN PG (FULL): 1.8 MMHG
BH CV ECHO MEAS - AO MEAN PG: 3.2 MMHG
BH CV ECHO MEAS - AO ROOT AREA (BSA CORRECTED): 1.9
BH CV ECHO MEAS - AO ROOT AREA: 12.3 CM^2
BH CV ECHO MEAS - AO ROOT DIAM: 4 CM
BH CV ECHO MEAS - AO V2 MAX: 124.3 CM/SEC
BH CV ECHO MEAS - AO V2 MEAN: 84.1 CM/SEC
BH CV ECHO MEAS - AO V2 VTI: 23.6 CM
BH CV ECHO MEAS - ASC AORTA: 3.5 CM
BH CV ECHO MEAS - AVA(I,A): 2.3 CM^2
BH CV ECHO MEAS - AVA(I,D): 2.3 CM^2
BH CV ECHO MEAS - AVA(V,A): 2.3 CM^2
BH CV ECHO MEAS - AVA(V,D): 2.3 CM^2
BH CV ECHO MEAS - BSA(HAYCOCK): 2.1 M^2
BH CV ECHO MEAS - BSA: 2.1 M^2
BH CV ECHO MEAS - BZI_BMI: 27.8 KILOGRAMS/M^2
BH CV ECHO MEAS - BZI_METRIC_HEIGHT: 180.3 CM
BH CV ECHO MEAS - BZI_METRIC_WEIGHT: 90.3 KG
BH CV ECHO MEAS - EDV(CUBED): 230.4 ML
BH CV ECHO MEAS - EDV(MOD-SP4): 99 ML
BH CV ECHO MEAS - EDV(TEICH): 189.1 ML
BH CV ECHO MEAS - EF(CUBED): 36.3 %
BH CV ECHO MEAS - EF(MOD-SP4): 54.1 %
BH CV ECHO MEAS - EF(TEICH): 29.2 %
BH CV ECHO MEAS - ESV(CUBED): 146.8 ML
BH CV ECHO MEAS - ESV(MOD-SP4): 45.5 ML
BH CV ECHO MEAS - ESV(TEICH): 133.9 ML
BH CV ECHO MEAS - FS: 14 %
BH CV ECHO MEAS - IVS/LVPW: 1
BH CV ECHO MEAS - IVSD: 1.3 CM
BH CV ECHO MEAS - LA DIMENSION: 4 CM
BH CV ECHO MEAS - LA/AO: 1
BH CV ECHO MEAS - LV DIASTOLIC VOL/BSA (35-75): 47.1 ML/M^2
BH CV ECHO MEAS - LV MASS(C)D: 347 GRAMS
BH CV ECHO MEAS - LV MASS(C)DI: 164.9 GRAMS/M^2
BH CV ECHO MEAS - LV MAX PG: 2.2 MMHG
BH CV ECHO MEAS - LV MEAN PG: 1.4 MMHG
BH CV ECHO MEAS - LV SYSTOLIC VOL/BSA (12-30): 21.6 ML/M^2
BH CV ECHO MEAS - LV V1 MAX: 73.8 CM/SEC
BH CV ECHO MEAS - LV V1 MEAN: 57 CM/SEC
BH CV ECHO MEAS - LV V1 VTI: 13.8 CM
BH CV ECHO MEAS - LVIDD: 6.1 CM
BH CV ECHO MEAS - LVIDS: 5.3 CM
BH CV ECHO MEAS - LVOT AREA: 3.9 CM^2
BH CV ECHO MEAS - LVOT DIAM: 2.2 CM
BH CV ECHO MEAS - LVPWD: 1.2 CM
BH CV ECHO MEAS - MV A MAX VEL: 74.2 CM/SEC
BH CV ECHO MEAS - MV DEC SLOPE: 176.8 CM/SEC^2
BH CV ECHO MEAS - MV DEC TIME: 0.18 SEC
BH CV ECHO MEAS - MV E MAX VEL: 31.1 CM/SEC
BH CV ECHO MEAS - MV E/A: 0.42
BH CV ECHO MEAS - MV MAX PG: 3.2 MMHG
BH CV ECHO MEAS - MV MEAN PG: 1.6 MMHG
BH CV ECHO MEAS - MV V2 MAX: 89 CM/SEC
BH CV ECHO MEAS - MV V2 MEAN: 61.2 CM/SEC
BH CV ECHO MEAS - MV V2 VTI: 22.9 CM
BH CV ECHO MEAS - MVA(VTI): 2.4 CM^2
BH CV ECHO MEAS - PA ACC TIME: 0.07 SEC
BH CV ECHO MEAS - PA PR(ACCEL): 47.9 MMHG
BH CV ECHO MEAS - PULM DIAS VEL: 52.9 CM/SEC
BH CV ECHO MEAS - PULM S/D: 1.1
BH CV ECHO MEAS - PULM SYS VEL: 57.5 CM/SEC
BH CV ECHO MEAS - RAP SYSTOLE: 3 MMHG
BH CV ECHO MEAS - RV MAX PG: 4.4 MMHG
BH CV ECHO MEAS - RV MEAN PG: 2.8 MMHG
BH CV ECHO MEAS - RV V1 MAX: 105 CM/SEC
BH CV ECHO MEAS - RV V1 MEAN: 80.7 CM/SEC
BH CV ECHO MEAS - RV V1 VTI: 18.4 CM
BH CV ECHO MEAS - RVDD: 3.2 CM
BH CV ECHO MEAS - RVSP: 21.8 MMHG
BH CV ECHO MEAS - SI(AO): 138 ML/M^2
BH CV ECHO MEAS - SI(CUBED): 39.7 ML/M^2
BH CV ECHO MEAS - SI(LVOT): 25.7 ML/M^2
BH CV ECHO MEAS - SI(MOD-SP4): 25.4 ML/M^2
BH CV ECHO MEAS - SI(TEICH): 26.2 ML/M^2
BH CV ECHO MEAS - SV(AO): 290.5 ML
BH CV ECHO MEAS - SV(CUBED): 83.6 ML
BH CV ECHO MEAS - SV(LVOT): 54.1 ML
BH CV ECHO MEAS - SV(MOD-SP4): 53.5 ML
BH CV ECHO MEAS - SV(TEICH): 55.2 ML
BH CV ECHO MEAS - TR MAX VEL: 216.9 CM/SEC
LV EF 2D ECHO EST: 50 %
MAXIMAL PREDICTED HEART RATE: 141 BPM
SARS-COV-2 ORF1AB RESP QL NAA+PROBE: NOT DETECTED
SARS-COV-2 ORF1AB RESP QL NAA+PROBE: NOT DETECTED
STRESS TARGET HR: 120 BPM

## 2021-01-01 PROCEDURE — G0153 HHCP-SVS OF S/L PATH,EA 15MN: HCPCS

## 2021-01-01 PROCEDURE — 93000 ELECTROCARDIOGRAM COMPLETE: CPT | Performed by: INTERNAL MEDICINE

## 2021-01-01 PROCEDURE — 93296 REM INTERROG EVL PM/IDS: CPT | Performed by: INTERNAL MEDICINE

## 2021-01-01 PROCEDURE — 92611 MOTION FLUOROSCOPY/SWALLOW: CPT

## 2021-01-01 PROCEDURE — 93306 TTE W/DOPPLER COMPLETE: CPT | Performed by: INTERNAL MEDICINE

## 2021-01-01 PROCEDURE — 90662 IIV NO PRSV INCREASED AG IM: CPT | Performed by: NURSE PRACTITIONER

## 2021-01-01 PROCEDURE — 99214 OFFICE O/P EST MOD 30 MIN: CPT | Performed by: INTERNAL MEDICINE

## 2021-01-01 PROCEDURE — 74230 X-RAY XM SWLNG FUNCJ C+: CPT

## 2021-01-01 PROCEDURE — 25010000002 PROPOFOL 10 MG/ML EMULSION: Performed by: ANESTHESIOLOGY

## 2021-01-01 PROCEDURE — U0004 COV-19 TEST NON-CDC HGH THRU: HCPCS

## 2021-01-01 PROCEDURE — C9803 HOPD COVID-19 SPEC COLLECT: HCPCS

## 2021-01-01 PROCEDURE — 93306 TTE W/DOPPLER COMPLETE: CPT

## 2021-01-01 PROCEDURE — 93294 REM INTERROG EVL PM/LDLS PM: CPT | Performed by: INTERNAL MEDICINE

## 2021-01-01 PROCEDURE — U0005 INFEC AGEN DETEC AMPLI PROBE: HCPCS

## 2021-01-01 PROCEDURE — 93280 PM DEVICE PROGR EVAL DUAL: CPT | Performed by: INTERNAL MEDICINE

## 2021-01-01 PROCEDURE — G0008 ADMIN INFLUENZA VIRUS VAC: HCPCS | Performed by: NURSE PRACTITIONER

## 2021-01-01 RX ORDER — POLYETHYLENE GLYCOL 3350 17 G/17G
17 POWDER, FOR SOLUTION ORAL DAILY
COMMUNITY

## 2021-01-01 RX ORDER — PROPOFOL 10 MG/ML
VIAL (ML) INTRAVENOUS AS NEEDED
Status: DISCONTINUED | OUTPATIENT
Start: 2021-01-01 | End: 2021-01-01 | Stop reason: SURG

## 2021-01-01 RX ORDER — SODIUM CHLORIDE 9 MG/ML
INJECTION, SOLUTION INTRAVENOUS CONTINUOUS PRN
Status: DISCONTINUED | OUTPATIENT
Start: 2021-01-01 | End: 2021-01-01 | Stop reason: SURG

## 2021-01-01 RX ORDER — LIDOCAINE HYDROCHLORIDE 10 MG/ML
INJECTION, SOLUTION EPIDURAL; INFILTRATION; INTRACAUDAL; PERINEURAL AS NEEDED
Status: DISCONTINUED | OUTPATIENT
Start: 2021-01-01 | End: 2021-01-01 | Stop reason: SURG

## 2021-01-01 RX ORDER — FOLIC ACID 1 MG/1
1 TABLET ORAL DAILY
COMMUNITY

## 2021-01-01 RX ADMIN — LIDOCAINE HYDROCHLORIDE 50 MG: 10 INJECTION, SOLUTION EPIDURAL; INFILTRATION; INTRACAUDAL; PERINEURAL at 10:48

## 2021-01-01 RX ADMIN — PROPOFOL 150 MG: 10 INJECTION, EMULSION INTRAVENOUS at 10:48

## 2021-01-01 RX ADMIN — SODIUM CHLORIDE: 0.9 INJECTION, SOLUTION INTRAVENOUS at 10:42

## 2021-03-15 NOTE — TELEPHONE ENCOUNTER
Called patient, left message on machine, PM results are normal. 6 years batters, leads normal, couple fast HR lasting 2 seconds and 3 seconds. Follow up scheduled.

## 2021-06-14 NOTE — PROGRESS NOTES
Encounter Date:06/14/2021  Last seen 11/30/2020      Patient ID: Herve Pérez Sr. is a 80 y.o. male.    Chief Complaint:  Status post pacemaker  Premature ventricle contractions  Hypertension  Dyslipidemia  Paroxysmal atrial fibrillation  Anticoagulation management        History of Present Illness  Sleeps a lot.  Evening lower extremity swelling.    Since I have last seen, the patient has been without any chest discomfort ,shortness of breath, palpitations, dizziness or syncope.  Denies having any headache ,abdominal pain ,nausea, vomiting , diarrhea constipation, loss of weight or loss of appetite.  Denies having any excessive bruising ,hematuria or blood in the stool.    Review of all systems negative except as indicated.    Reviewed ROS.    Assessment and Plan            [[[[  Impression  ====  -status permanent dual-chamber pacemaker implantation (Medtronic MRI compatible) 05/24/2017.  Tachy-ruy syndrome     -Rare paroxysmal atrial fibrillation      - premature ventricle contractions.-Improved     -Cardiac catheterization 05/17/2017 revealed mild anterior wall hypokinesis and normal coronary arteries.  Ejection fraction 55-60%.    Echocardiogram 6/14/2021 revealed mild proximal inferior wall hypokinesis with ejection fraction of 50%.     Echocardiogram 8/12/2020 performed at [facility] was reviewed and showed ejection fraction of 40%.  Moderate left atrial enlargement was present.     Echocardiogram 05/10/2017 revealed left atrial enlargement thickened aortic valve and proximal posterior wall hypokinesis.  Stress Cardiolite test is abnormal 05/10/2017 with poster lateral ischemia.  Patient had increased ventricular ectopy with couplets.     - strong family history of coronary artery disease      -dyslipidemia and hypertension     -mild lower extremity edema probably secondary to varicose veins      -former smoker      -allergy to Augmentin.  ====    Plan  ===  Status post pacemaker  Pacemaker  site and function is normal.  Interrogation of the pacemaker revealed excellent pacing parameters.  Battery status is 6 years.    Paroxysmal atrial fibrillation.  EKG showed atrial paced ventricular sensed rhythm.    Hypertension-well controlled 130/80.  Continue metoprolol    Dyslipidemia-continue simvastatin    Anticoagulation management  Continue Eliquis    Patient has mild lower extremity edema.  Echocardiogram 6/14/2021-as above.    Patient is not having any angina pectoris or congestive heart failure.    Medications were reviewed and updated.  Continue Lopressor 25 mg  tablet half a tablet twice a day.    Followup in the office  in 6 months with pacemaker interrogation.  Further plan will depend on patient's progress.  [[[[                      Diagnosis Plan   1. Pacemaker  ECG 12 Lead   2. Bradycardia, sinus  ECG 12 Lead   3. Paroxysmal atrial fibrillation (CMS/HCC)  ECG 12 Lead   4. Essential hypertension  ECG 12 Lead   5. Dyslipidemia  ECG 12 Lead   6. PVC's (premature ventricular contractions)  ECG 12 Lead   LAB RESULTS (LAST 7 DAYS)    CBC        BMP        CMP         BNP        TROPONIN        CoAg        Creatinine Clearance  CrCl cannot be calculated (Patient's most recent lab result is older than the maximum 30 days allowed.).    ABG        Radiology  No radiology results for the last day                The following portions of the patient's history were reviewed and updated as appropriate: allergies, current medications, past family history, past medical history, past social history, past surgical history and problem list.    Review of Systems   Constitutional: Positive for malaise/fatigue.   Cardiovascular: Negative for chest pain, leg swelling, palpitations and syncope.   Respiratory: Negative for shortness of breath.    Skin: Negative for rash.   Gastrointestinal: Negative for nausea and vomiting.   Neurological: Negative for dizziness, light-headedness and numbness.         Current Outpatient  Medications:   •  allopurinol (ZYLOPRIM) 300 MG tablet, , Disp: , Rfl:   •  apixaban (ELIQUIS) 5 MG tablet tablet, Every 12 (Twelve) Hours., Disp: , Rfl:   •  Cholecalciferol (vitamin D3) 125 MCG (5000 UT) capsule capsule, Take 5,000 Units by mouth Daily., Disp: , Rfl:   •  docusate sodium (CVS STOOL SOFTENER) 100 MG capsule, CVS STOOL SOFTENER 100 MG CAPS, Disp: , Rfl:   •  finasteride (PROSCAR) 5 MG tablet, , Disp: , Rfl:   •  metoprolol tartrate (LOPRESSOR) 25 MG tablet, Take 12.5 mg by mouth 2 (Two) Times a Day., Disp: , Rfl:   •  simvastatin (ZOCOR) 40 MG tablet, , Disp: , Rfl:   •  tamsulosin (FLOMAX) 0.4 MG capsule 24 hr capsule, , Disp: , Rfl:   •  vitamin B-12 (CYANOCOBALAMIN) 100 MCG tablet, Take 50 mcg by mouth Daily., Disp: , Rfl:     Allergies   Allergen Reactions   • Amoxicillin-Pot Clavulanate Unknown (See Comments)       Family History   Problem Relation Age of Onset   • Heart disease Mother    • Heart disease Sister    • Heart disease Brother        Past Surgical History:   Procedure Laterality Date   • BACK SURGERY     • CARDIAC CATHETERIZATION     • CATARACT EXTRACTION     • PACEMAKER IMPLANTATION         Past Medical History:   Diagnosis Date   • Hyperlipidemia    • Hypertension        Family History   Problem Relation Age of Onset   • Heart disease Mother    • Heart disease Sister    • Heart disease Brother        Social History     Socioeconomic History   • Marital status:      Spouse name: Not on file   • Number of children: Not on file   • Years of education: Not on file   • Highest education level: Not on file   Tobacco Use   • Smoking status: Former Smoker   • Smokeless tobacco: Never Used   Vaping Use   • Vaping Use: Never used   Substance and Sexual Activity   • Alcohol use: Yes     Comment: rare; beer           ECG 12 Lead    Date/Time: 6/14/2021 3:54 PM  Performed by: Cesar Rodrigez MD  Authorized by: Cesar Rodrigez MD   Comparison: compared with previous ECG   Similar to  "previous ECG  Comparison to previous ECG: Atrial paced ventricular sensed rhythm 68/min nonspecific ST-T wave changes premature ventricular contraction normal axis normal intervals no ectopy no significant change from 6/3/2020                Objective:       Physical Exam    /80   Pulse 68   Ht 180.3 cm (71\")   Wt 83.9 kg (185 lb)   BMI 25.80 kg/m²   The patient is alert, oriented and in no distress.    Vital signs as noted above.    Head and neck revealed no carotid bruits or jugular venous distension.  No thyromegaly or lymphadenopathy is present.    Lungs clear.  No wheezing.  Breath sounds are normal bilaterally.    Heart normal first and second heart sounds.  No murmur..  No pericardial rub is present.  No gallop is present.    Abdomen soft and nontender.  No organomegaly is present.    Extremities revealed good peripheral pulses without any pedal edema.    Skin warm and dry.  Pacemaker site looks normal.    Musculoskeletal system is grossly normal.    CNS grossly normal.    Reviewed and unchanged from last visit.        "

## 2021-07-21 ENCOUNTER — OFFICE (OUTPATIENT)
Dept: URBAN - METROPOLITAN AREA CLINIC 64 | Facility: CLINIC | Age: 81
End: 2021-07-21
Payer: MEDICARE

## 2021-07-21 ENCOUNTER — OFFICE (OUTPATIENT)
Dept: URBAN - METROPOLITAN AREA CLINIC 64 | Facility: CLINIC | Age: 81
End: 2021-07-21

## 2021-07-21 VITALS
DIASTOLIC BLOOD PRESSURE: 72 MMHG | SYSTOLIC BLOOD PRESSURE: 130 MMHG | HEIGHT: 71 IN | DIASTOLIC BLOOD PRESSURE: 72 MMHG | SYSTOLIC BLOOD PRESSURE: 130 MMHG | HEIGHT: 71 IN | HEART RATE: 68 BPM | WEIGHT: 183 LBS | HEART RATE: 68 BPM | WEIGHT: 183 LBS

## 2021-07-21 DIAGNOSIS — K59.00 CONSTIPATION, UNSPECIFIED: ICD-10-CM

## 2021-07-21 DIAGNOSIS — R13.10 DYSPHAGIA, UNSPECIFIED: ICD-10-CM

## 2021-07-21 DIAGNOSIS — Z79.01 LONG TERM (CURRENT) USE OF ANTICOAGULANTS: ICD-10-CM

## 2021-07-21 DIAGNOSIS — R63.4 ABNORMAL WEIGHT LOSS: ICD-10-CM

## 2021-07-21 PROCEDURE — 99203 OFFICE O/P NEW LOW 30 MIN: CPT | Performed by: NURSE PRACTITIONER

## 2021-07-21 PROCEDURE — 99243 OFF/OP CNSLTJ NEW/EST LOW 30: CPT | Performed by: NURSE PRACTITIONER

## 2021-09-20 NOTE — TELEPHONE ENCOUNTER
Spoke to wife, 5 yrs battery, leads stable. 3 short episodes of elevated heart rates. Patient has a follow up scheduled.

## 2021-09-30 NOTE — PROGRESS NOTES
Injection  Injection performed in right deltoid by Tianna Krishnan MA. Patient tolerated the procedure well without complications.  09/30/21   Tianna Krishnan MA

## 2021-10-12 NOTE — ANESTHESIA PREPROCEDURE EVALUATION
Anesthesia Evaluation     Patient summary reviewed and Nursing notes reviewed   no history of anesthetic complications:  NPO Solid Status: > 8 hours  NPO Liquid Status: > 8 hours           Airway   Dental      Pulmonary    (+) a smoker Former,   Cardiovascular     ECG reviewed  PT is on anticoagulation therapy  Patient on routine beta blocker    (+) pacemaker pacemaker, hypertension, dysrhythmias Bradycardia, Paroxysmal Atrial Fib, hyperlipidemia,       Neuro/Psych  GI/Hepatic/Renal/Endo    (+)  GERD,      Musculoskeletal     Abdominal    Substance History      OB/GYN          Other   blood dyscrasia thrombocytopenia,     ROS/Med Hx Other: Dysphagia    PSH  CARDIAC CATHETERIZATION PACEMAKER IMPLANTATION  CATARACT EXTRACTION BACK SURGERY                Anesthesia Plan    ASA 3     MAC   (Patient identified; pre-operative vital signs, all relevant labs/studies, complete medical/surgical/anesthetic history, full medication list, full allergy list, and NPO status obtained/reviewed; physical assessment performed; anesthetic options, side effects, potential complications, risks, and benefits discussed; questions answered; written anesthesia consent obtained; patient cleared for procedure; anesthesia machine and equipment checked and functioning)    Anesthetic plan, all risks, benefits, and alternatives have been provided, discussed and informed consent has been obtained with: patient.

## 2021-10-13 ENCOUNTER — ON CAMPUS - OUTPATIENT (OUTPATIENT)
Dept: URBAN - METROPOLITAN AREA HOSPITAL 85 | Facility: HOSPITAL | Age: 81
End: 2021-10-13

## 2021-10-13 DIAGNOSIS — R13.10 DYSPHAGIA, UNSPECIFIED: ICD-10-CM

## 2021-10-13 DIAGNOSIS — R63.4 ABNORMAL WEIGHT LOSS: ICD-10-CM

## 2021-10-13 PROCEDURE — 43235 EGD DIAGNOSTIC BRUSH WASH: CPT | Performed by: INTERNAL MEDICINE

## 2021-10-13 PROCEDURE — 43450 DILATE ESOPHAGUS 1/MULT PASS: CPT | Performed by: INTERNAL MEDICINE

## 2021-10-13 NOTE — H&P
" LOS: 0 days   Patient Care Team:  Mayo Phillip MD as PCP - General  [facility] (Cardiology)      Subjective     Interval History:     Subjective: Dysphagia      ROS:   No chest pain, shortness of breath, or cough.  No nausea or vomiting, no hematemesis         Medication Review:   No current facility-administered medications for this encounter.    Current Outpatient Medications:   •  allopurinol (ZYLOPRIM) 300 MG tablet, Take 300 mg by mouth Daily., Disp: , Rfl:   •  apixaban (ELIQUIS) 5 MG tablet tablet, Take 5 mg by mouth Every 12 (Twelve) Hours. LD 10/10, Disp: , Rfl:   •  docusate sodium (CVS STOOL SOFTENER) 100 MG capsule, Take 100 mg by mouth. One in  Am and 2 at night, Disp: , Rfl:   •  finasteride (PROSCAR) 5 MG tablet, Take 5 mg by mouth Daily., Disp: , Rfl:   •  folic acid (FOLVITE) 1 MG tablet, Take 1 mg by mouth Daily., Disp: , Rfl:   •  metoprolol tartrate (LOPRESSOR) 25 MG tablet, Take 12.5 mg by mouth 2 (Two) Times a Day. Take DOS, Disp: , Rfl:   •  polyethylene glycol (MiraLax) 17 g packet, Take 17 g by mouth Daily., Disp: , Rfl:   •  simvastatin (ZOCOR) 40 MG tablet, Take 40 mg by mouth Every Night., Disp: , Rfl:   •  tamsulosin (FLOMAX) 0.4 MG capsule 24 hr capsule, Take 2 capsules by mouth Every Night., Disp: , Rfl:   •  Cholecalciferol (vitamin D3) 125 MCG (5000 UT) capsule capsule, Take 5,000 Units by mouth Daily., Disp: , Rfl:   •  vitamin B-12 (CYANOCOBALAMIN) 100 MCG tablet, Take 50 mcg by mouth Daily., Disp: , Rfl:       Objective     Vital Signs  Vitals:    10/06/21 1136   Weight: 80.3 kg (177 lb)   Height: 181.6 cm (71.5\")       Physical Exam:    General Appearance:    Awake and alert, in no acute distress   Head:    Normocephalic, without obvious abnormality   Eyes:          Conjunctivae normal, anicteric sclera   Throat:   No oral lesions, no thrush, oral mucosa moist   Neck:   No adenopathy, supple, no JVD   CV/Lungs:     RRR, respirations " regular, even and unlabored   Abdomen:     Soft, non-tender, no rebound or guarding, non-distended, no hepatosplenomegaly   Rectal:     Deferred   Extremities:   No edema, no cyanosis   Skin:   No bruising or rash, no jaundice        Results Review:    Lab Results (last 24 hours)     ** No results found for the last 24 hours. **          Imaging Results (Last 24 Hours)     ** No results found for the last 24 hours. **            Assessment/Plan   Proceed with scope and MAC anesthesia    Proceed with EGD and possible dilation    Jassi Kent MD  10/13/21  07:29 EDT

## 2021-10-13 NOTE — OP NOTE
ESOPHAGOGASTRODUODENOSCOPY  Procedure Report    Patient Name:  Herve Pérez Sr.  YOB: 1940    Date of Surgery:  10/13/2021     Indications: Dysphagia  Weight loss    Pre-op Diagnosis:   Dysphagia [R13.10]         Post-op Diagnosis:  Normal upper GI endoscopy status post sounding of the esophagus with 50 Ethiopian [place] dilator      Procedure(s):  ESOPHAGOGASTRODUODENOSCOPY    Staff:  Surgeon(s):  Jassi Kent MD         Anesthesia: Monitored Anesthesia Care    Estimated Blood Loss: None  Implants:    Nothing was implanted during the procedure    Specimen:          None    Complications:  No immediate    Description of Procedure:  Informed consent was obtained from the patient.  They were placed in the left lateral decubitus position and IV conscious sedation was administered by anesthesia while being monitored continuously throughout the procedure.  The video Olympus endoscope was introduced into the esophagus and was advanced under direct vision the second portion the duodenum which is normal as was the bulb.  The pylorus is widely patent.  The antrum body fundus and cardia were normal.  Retroflexion showed no hiatal hernia or varices.  There is no evidence of any tumor in the cardia.  The gastric mucosa is normal.  The squamocolumnar line was photographed at the GE junction without evidence of stricture or mass or esophagitis.  The remaining esophagus was normal.  I did not appreciate a Zenker's diverticulum at the UES.  Because of his dysphagia and weight loss he was sounded with passage of a 50 [place] dilator.  It passed with minimal if any resistance.  He tolerated the procedure well returned to recovery good condition.    Impression:  Dysphagia without definitive stricture or cause identified in endoscopy.  Consider dysmotility such as achalasia or neuromuscular dysphagia.    Recommendations:  We will recommend video swallow followed by esophageal manometry if he sees no  benefit from esophageal dilation.  Can try mechanical soft diet with ground meats  Call for any postop problem  Appreciate the referral thank you      Jassi Kent MD     Date: 10/13/2021  Time: 10:56 EDT

## 2021-10-13 NOTE — ANESTHESIA POSTPROCEDURE EVALUATION
Patient: Herve Pérez Sr.    Procedure Summary     Date: 10/13/21 Room / Location: [facility] ENDOSCOPY 1 / [facility] ENDOSCOPY    Anesthesia Start: 1042 Anesthesia Stop: 1100    Procedure: ESOPHAGOGASTRODUODENOSCOPY (N/A ) Diagnosis:       Dysphagia      (Dysphagia [R13.10])    Surgeons: Jassi Kent MD Provider: Fox Elmore MD    Anesthesia Type: MAC ASA Status: 3          Anesthesia Type: MAC    Vitals  Vitals Value Taken Time   /63 10/13/21 1100   Temp     Pulse 79 10/13/21 1100   Resp     SpO2 100 % 10/13/21 1100   Vitals shown include unvalidated device data.        Post Anesthesia Care and Evaluation    Patient location during evaluation: PACU  Patient participation: complete - patient participated  Level of consciousness: awake  Pain scale: See nurse's notes for pain score.  Pain management: adequate  Airway patency: patent  Anesthetic complications: No anesthetic complications  PONV Status: none  Cardiovascular status: acceptable  Respiratory status: acceptable  Hydration status: acceptable    Comments: Patient seen and examined postoperatively; vital signs stable; SpO2 greater than or equal to 90%; cardiopulmonary status stable; nausea/vomiting adequately controlled; pain adequately controlled; no apparent anesthesia complications; patient discharged from anesthesia care when discharge criteria were met

## 2021-10-13 NOTE — DISCHARGE INSTRUCTIONS
A responsible adult should stay with you and you should rest quietly for the rest of the day.    Do not drink alcohol, drive, operate any heavy machinery or power tools or make any legal/important decisions for the next 24 hours.     Progress your diet as tolerated.  If you begin to experience severe pain, increased shortness of breath, racing heartbeat or a fever above 101 F, seek immediate medical attention.     Follow up with MD as instructed. Call office for results in 3 to 5 days if needed.    DR NAVARRO [phone]    Impression:  Dysphagia without definitive stricture or cause identified in endoscopy.  Consider dysmotility such as achalasia or neuromuscular dysphagia.     Recommendations:  We will recommend video swallow followed by esophageal manometry if he sees no benefit from esophageal dilation.  Can try mechanical soft diet with ground meats  Call for any postop problem  Appreciate the referral thank you

## 2021-11-04 NOTE — MBS/VFSS/FEES
Acute Care - Speech Language Pathology   Swallow Initial Evaluation [facility]     Patient Name: Herve Pérez Sr.  : 1940  MRN: [id number]  Today's Date: 2021               Admit Date: 2021    Visit Dx:     ICD-10-CM ICD-9-CM   1. Dysphagia, unspecified type  R13.10 787.20     Patient Active Problem List   Diagnosis   • Pacemaker   • Bradycardia, sinus   • Dyslipidemia   • Paroxysmal atrial fibrillation (HCC)   • Essential hypertension     Past Medical History:   Diagnosis Date   • Bradycardia    • Choking due to food in larynx    • GERD (gastroesophageal reflux disease)    • Hyperlipidemia    • Hypertension    • Pacemaker      Past Surgical History:   Procedure Laterality Date   • BACK SURGERY     • CARDIAC CATHETERIZATION     • CATARACT EXTRACTION     • ENDOSCOPY N/A 10/13/2021    Procedure: ESOPHAGOGASTRODUODENOSCOPY with dilitaion(#50 bougie);  Surgeon: Jassi Kent MD;  Location: [facility];  Service: Gastroenterology;  Laterality: N/A;  Normal EGD/dysphagia   • PACEMAKER IMPLANTATION         SLP Recommendation and Plan  SLP Swallowing Diagnosis: mod-severe, oral dysphagia, pharyngeal dysphagia (21)  SLP Diet Recommendation: ground, nectar thick liquids (cintron water protocol) (21)  Recommended Precautions and Strategies: upright posture during/after eating, small bites of food and sips of liquid, multiple swallows per bite of food, multiple swallows per sip of liquid, alternate between small bites of food and sips of liquid, fatigue precautions, general aspiration precautions (21)  SLP Rec. for Method of Medication Administration: meds crushed, with thick liquids, with pudding or applesauce (21 1300)        Recommended Diagnostics: reassess via VFSS (MBS) (21)  Swallow Criteria for Skilled Therapeutic Interventions Met: demonstrates skilled criteria (21)                      Plan for Continued Treatment (SLP):  "Recommend outpatient or home health swallow therapy. Pt requesting home health d/t mobility issues and bladder condition (11/04/21 1300)                     SWALLOW EVALUATION (last 72 hours)     SLP Adult Swallow Evaluation     Row Name 11/04/21 1300       Rehab Evaluation    Document Type evaluation  -EC    Subjective Information complains of  drooling and swallow difficulty  -EC    Patient Observations alert; cooperative; agree to therapy  -EC    Care Plan Review evaluation/treatment results reviewed; care plan/treatment goals reviewed; risks/benefits reviewed; patient/other agree to care plan  -EC    Care Plan Review, Other Participant(s) spouse; daughter  -EC    Patient Effort good  -EC    Symptoms Noted During/After Treatment none  -EC            General Information    Patient Profile Reviewed yes  -EC    Pertinent History Of Current Problem Pt is an 81 y.o. male presenting today for video swallow study d/t intermittent choking w/solids and liquids. Pt reports swallow difficulty began approx a year and a half ago, along w/drooling and changes in personality and speech as reported by daughter and wife. Pt states he feels there is a \"shelf in my throat,\" where food/liquid is getting stuck. Pt reports it helps food clear when he stands up straight. Pt had EGD w/dilation on 10/13/21 w/ the following impression and recommendations; \"Impression: Dysphagia without definitive stricture or cause identified in endoscopy.  Consider dysmotility such as achalasia or neuromuscular dysphagia. Recommendations: We will recommend video swallow followed by esophageal manometry if he sees no benefit from esophageal dilation. Can try mechanical soft diet with ground meats.\" Pt reports some improvement in swallow after EGD though still w/intermittent choking. Pt states no hx of stroke, no hx of head or neck CA, no hx of cervical spine surgery and no hx of reflux. Pt reports an approx 50lb weight loss in the last year and a half and " loss of sense of taste. Pt is noted w/ R lingual deviation upon protrusion during oral mechanism exam and pooling secretions in oral cavity. Pt also noted w/baseline mild wet vocal quality prior to PO trials.  -EC    Current Method of Nutrition regular textures; thin liquids  -EC    Precautions/Limitations, Vision WFL; for purposes of eval  -EC    Precautions/Limitations, Hearing hearing impairment, bilaterally  wearing hearing aids  -EC    Prior Level of Function-Communication WFL  -EC    Prior Level of Function-Swallowing no diet consistency restrictions  -EC    Plans/Goals Discussed with patient and family  -EC    Barriers to Rehab none identified  -EC    Patient's Goals for Discharge eat/drink without coughing/choking  -EC    Family Goals for Discharge patient able to eat/drink without coughing/choking  -EC            Pain    Additional Documentation --  no pain reported at eval  -EC            Oral Motor Structure and Function    Dentition Assessment natural, present and adequate  -EC    Secretion Management anterior loss; problems swallowing secretions; wet vocal quality  pooling in oral cavity  -EC    Mucosal Quality moist, healthy  -EC            Oral Musculature and Cranial Nerve Assessment    Lingual Impairment, Detail. Cranial Nerves IX, XII (Glossopharyngeal and Hypoglossal) --  deviation to R upon protrusion  -EC    Vocal Impairment, Detail. Cranial Nerve X (Vagus) --  reduced intensity  -EC    Oral Motor, Comment Pt's daughter and spouse state pt's intensity has lowered in last year and a half, coinciding w/onset of swallow difficulty and drooling. Pt is noted w/R lingual deviation upon protrusion.  -EC            General Eating/Swallowing Observations    Respiratory Support Currently in Use room air  -EC    Eating/Swallowing Skills self-fed; appropriate self-feeding skills observed  -EC    Positioning During Eating upright 90 degree; upright in chair  lateral view  -EC            MBS/VFSS    Utensils  Used spoon; cup  -EC    Consistencies Trialed ground; mixed consistency; pureed; thin liquids; nectar/syrup-thick liquids  -EC            MBS/VFSS Interpretation    VFSS Summary VFSS completed w/trials of thin liquid x5, nectar thick liquid x4, puree applesauce x2 and mechanical soft mixed consistency peaches x2. Pt feeds himself all trials independently and functionally. Overall, pt presents w/moderate to severe oral-pharyngeal dysphagia characterized by disorganized oral transit, severely reduced anterior hyoid movement, absent epiglottic deflection, penetration and aspiration of thin liquid and significant pharyngeal residue w/all consistencies requiring liquid washes and multiple dry swallows. Recommend a mechanical ground, slick diet at this time w/nectar thick liquids, alternating solids and liquids and utilizing dry swallows to aid in clearance of pharyngeal residue. Pt should take meds in liquid forms or crushed and mixed w/applesauce or NTL. Recommend pt receive intensive swallow therapy w/repeat VFSS prior to diet changes. Recommend Alvarado Water Protocol for comfort and increased hydration. Results and recs discussed in detail w/pt and family who express understanding. Family wishes to pursue home health swallow therapy due to pt's mobility issues and bladder condition. Information/lists on diet recs, alvarado water protocol, thickened liquids and aspiration precautions were provided to pt.   Detailed results as follows:   THIN: Pt takes 3 trials in head neutral position, one trial w/chin tuck and trials w/R and L head turn. Pt demonstrates absent epiglottic deflection during the swallow w/significant vallecular and pyriform residue. Pt w/penetration/aspiration of pharyngeal residue after the swallow and on second dry swallow to clear residue on 2/3 trials in head neutral position. Chin tuck and head turn maneuvers are not effective in preventing penetration and aspiration of thin liquid.   NTL: No  penetration or aspiration noted. Pt w/moderate vallecular residue after the swallow which clears to mild amount w/second swallows.   PUREE: Pt w/mildly disorganized oral transit of puree. Noted mod oral residue, severe vallecular and mod pyriform residue after the swallow. A NTL wash followed by 2 dry swallows clears residue down to mild amt. MECHANICAL SOFT MIXED CONSISTENCY: Slow, but functional mastication of peaches. Severe vallecular and pyriform residue after the swallow clears to mild amount w/NTL wash and dry swallow.  -EC            Clinical Impression    SLP Swallowing Diagnosis mod-severe; oral dysphagia; pharyngeal dysphagia  -EC    Functional Impact risk of aspiration/pneumonia; risk of malnutrition; risk of dehydration  -EC    Swallow Criteria for Skilled Therapeutic Interventions Met demonstrates skilled criteria  -EC    Plan for Continued Treatment (SLP) Recommend outpatient or home health swallow therapy. Pt requesting home health d/t mobility issues and bladder condition  -EC            Recommendations    SLP Diet Recommendation ground; nectar thick liquids  cintron water protocol  -EC    Recommended Diagnostics reassess via VFSS (MBS)  -EC    Recommended Precautions and Strategies upright posture during/after eating; small bites of food and sips of liquid; multiple swallows per bite of food; multiple swallows per sip of liquid; alternate between small bites of food and sips of liquid; fatigue precautions; general aspiration precautions  -EC    Oral Care Recommendations Before ice/water  -EC    SLP Rec. for Method of Medication Administration meds crushed; with thick liquids; with pudding or applesauce  -EC          User Key  (r) = Recorded By, (t) = Taken By, (c) = Cosigned By    Initials Name Effective Dates    Altagracia Rios 06/16/21 -                 EDUCATION  The patient has been educated in the following areas:   Dysphagia (Swallowing Impairment) Oral Care/Hydration Modified Diet  Instruction.     VFSS review and education was conducted this date. Individuals educated included: patient and child(sean). Education methods/means included verbal review face to face and playback of VFSS.  Education barriers: none identified Further follow up not indicated at this time , recommend follow up w/referring MD w/order for Swallow therapy.         Time Calculation:                Altagracia Nina  11/4/2021

## 2021-11-29 NOTE — HOME HEALTH
Skilled Speech Therapy warranted to improve  swallowing function patient in order to determine least restrictive diet. Patient homebond due to patient not able to leave home due to weaknees and fatigue easily and per wife not able to drive at this time.

## 2021-12-27 NOTE — PROGRESS NOTES
Encounter Date:12/27/2021      Patient ID: Herve Pérez Sr. is a 81 y.o. male.    Chief Complaint:  Status post pacemaker  Premature ventricle contractions  Hypertension  Dyslipidemia  Paroxysmal atrial fibrillation  Anticoagulation management        History of Present Illness  She recently had dysphagia and had esophageal dilatation.  Patient has prostatic hypertrophy and has catheter.  Patient is not eating well.  Lower extremity swelling    Since I have last seen, the patient has been without any chest discomfort ,shortness of breath, palpitations, dizziness or syncope.  Denies having any headache ,abdominal pain ,nausea, vomiting , diarrhea constipation, loss of weight or loss of appetite.  Denies having any excessive bruising ,hematuria or blood in the stool.     Review of all systems negative except as indicated.     Reviewed ROS.    Assessment and Plan            [[[[  Impression  ====  -status permanent dual-chamber pacemaker implantation ([employer]tronic MRI compatible) 05/24/2017.  Tachy-ruy syndrome     -Rare paroxysmal atrial fibrillation      - premature ventricle contractions.-Improved     -Cardiac catheterization 05/17/2017 revealed mild anterior wall hypokinesis and normal coronary arteries.  Ejection fraction 55-60%.     Echocardiogram 6/14/2021 revealed mild proximal inferior wall hypokinesis with ejection fraction of 50%.     Echocardiogram 8/12/2020 performed at [facility] was reviewed and showed ejection fraction of 40%.  Moderate left atrial enlargement was present.     Echocardiogram 05/10/2017 revealed left atrial enlargement thickened aortic valve and proximal posterior wall hypokinesis.  Stress Cardiolite test is abnormal 05/10/2017 with poster lateral ischemia.  Patient had increased ventricular ectopy with couplets.     - strong family history of coronary artery disease      -dyslipidemia and hypertension     -mild lower extremity edema probably secondary to varicose  veins      -former smoker      -allergy to Augmentin.  ====    Plan  ===  Status post pacemaker  Pacemaker site and function is normal.  Interrogation of the pacemaker revealed excellent pacing parameters.  Battery status is 5 years..     Paroxysmal atrial fibrillation.  EKG showed atrial paced ventricular sensed rhythm.     Hypertension-well controlled 112/72  Continue metoprolol     Dyslipidemia-continue simvastatin     Anticoagulation management  Continue Eliquis     Patient has mild lower extremity edema.  Echocardiogram 6/14/2021-as above.  Patient was started on low-dose Lasix 20 mg a day.    Dysphagia  Status post esophageal dilatation.    Parkinson disease-recently diagnosed.  Followed by primary care physician Dr. Peña.    Prostatic hypertrophy-patient has catheter.     Patient is not having any angina pectoris or congestive heart failure.     Medications were reviewed and updated.  Followup in the office  in 6 months with pacemaker interrogation.  Further plan will depend on patient's progress.  [[[[                     Diagnosis Plan   1. Pacemaker  ECG 12 Lead   2. Bradycardia, sinus  ECG 12 Lead   3. Paroxysmal atrial fibrillation (HCC)  ECG 12 Lead   4. Essential hypertension  ECG 12 Lead   5. PVC's (premature ventricular contractions)  ECG 12 Lead   6. Status post placement of cardiac pacemaker  ECG 12 Lead   LAB RESULTS (LAST 7 DAYS)    CBC        BMP        CMP         BNP        TROPONIN        CoAg        Creatinine Clearance  CrCl cannot be calculated (Patient's most recent lab result is older than the maximum 30 days allowed.).    ABG        Radiology  No radiology results for the last day                The following portions of the patient's history were reviewed and updated as appropriate: allergies, current medications, past family history, past medical history, past social history, past surgical history and problem list.    Review of Systems   Constitutional: Negative for fever and  malaise/fatigue.   Cardiovascular: Negative for chest pain, dyspnea on exertion and palpitations.   Respiratory: Negative for cough and shortness of breath.    Skin: Negative for rash.   Gastrointestinal: Negative for abdominal pain, nausea and vomiting.   Neurological: Negative for focal weakness and headaches.   All other systems reviewed and are negative.        Current Outpatient Medications:   •  allopurinol (ZYLOPRIM) 300 MG tablet, Take 300 mg by mouth Daily., Disp: , Rfl:   •  apixaban (ELIQUIS) 5 MG tablet tablet, Take 5 mg by mouth Every 12 (Twelve) Hours. LD 10/10, Disp: , Rfl:   •  Cholecalciferol (vitamin D3) 125 MCG (5000 UT) capsule capsule, Take 5,000 Units by mouth Daily., Disp: , Rfl:   •  docusate sodium ([employer] STOOL SOFTENER) 100 MG capsule, Take 100 mg by mouth. One in  Am and 2 at night, Disp: , Rfl:   •  finasteride (PROSCAR) 5 MG tablet, Take 5 mg by mouth Daily., Disp: , Rfl:   •  folic acid (FOLVITE) 1 MG tablet, Take 1 mg by mouth Daily., Disp: , Rfl:   •  metoprolol tartrate (LOPRESSOR) 25 MG tablet, Take 12.5 mg by mouth 2 (Two) Times a Day. Take DOS, Disp: , Rfl:   •  polyethylene glycol (MiraLax) 17 g packet, Take 17 g by mouth Daily., Disp: , Rfl:   •  simvastatin (ZOCOR) 40 MG tablet, Take 40 mg by mouth Every Night., Disp: , Rfl:   •  tamsulosin (FLOMAX) 0.4 MG capsule 24 hr capsule, Take 2 capsules by mouth Every Night., Disp: , Rfl:   •  vitamin B-12 (CYANOCOBALAMIN) 100 MCG tablet, Take 50 mcg by mouth Daily., Disp: , Rfl:     Allergies   Allergen Reactions   • Amoxicillin-Pot Clavulanate Rash       Family History   Problem Relation Age of Onset   • Heart disease Mother    • Heart disease Sister    • Heart disease Brother        Past Surgical History:   Procedure Laterality Date   • BACK SURGERY     • CARDIAC CATHETERIZATION     • CATARACT EXTRACTION     • ENDOSCOPY N/A 10/13/2021    Procedure: ESOPHAGOGASTRODUODENOSCOPY with dilitaion(#50 bougie);  Surgeon: Jassi Kent,  "MD;  Location: [facility] ENDOSCOPY;  Service: Gastroenterology;  Laterality: N/A;  Normal EGD/dysphagia   • PACEMAKER IMPLANTATION         Past Medical History:   Diagnosis Date   • Bradycardia    • Choking due to food in larynx    • GERD (gastroesophageal reflux disease)    • Hyperlipidemia    • Hypertension    • Pacemaker        Family History   Problem Relation Age of Onset   • Heart disease Mother    • Heart disease Sister    • Heart disease Brother        Social History     Socioeconomic History   • Marital status:    Tobacco Use   • Smoking status: Former Smoker   • Smokeless tobacco: Never Used   Vaping Use   • Vaping Use: Never used   Substance and Sexual Activity   • Alcohol use: Yes     Comment: rare; beer   • Drug use: Never           ECG 12 Lead    Date/Time: 12/27/2021 2:59 PM  Performed by: Cesar Rodrigez MD  Authorized by: Cesar Rodrigez MD   Comparison: compared with previous ECG   Similar to previous ECG  Comparison to previous ECG: Dual-chamber pacemaker rhythm.  Atrial paced ventricular sensed rhythm premature ventricular contractions 84/min.  No change from previous EKG.                Objective:       Physical Exam    /72 (BP Location: Left arm, Patient Position: Sitting, Cuff Size: Adult)   Pulse 81   Ht 180.3 cm (71\")   Wt 77.6 kg (171 lb)   SpO2 99%   BMI 23.85 kg/m²   The patient is alert, oriented and in no distress.    Vital signs as noted above.    Head and neck revealed no carotid bruits or jugular venous distension.  No thyromegaly or lymphadenopathy is present.    Lungs clear.  No wheezing.  Breath sounds are normal bilaterally.    Heart normal first and second heart sounds.  No murmur..  No pericardial rub is present.  No gallop is present.    Abdomen soft and nontender.  No organomegaly is present.    Extremities revealed good peripheral pulses. 1-2+ edema.    Skin warm and dry. Pacemaker site looks normal.    Musculoskeletal system is grossly normal.    CNS " grossly normal.    Reviewed and unchanged from last visit.

## 2022-01-01 ENCOUNTER — HOME CARE VISIT (OUTPATIENT)
Dept: HOME HEALTH SERVICES | Facility: HOME HEALTHCARE | Age: 82
End: 2022-01-01

## 2022-01-01 ENCOUNTER — TELEPHONE (OUTPATIENT)
Dept: CARDIOLOGY | Facility: CLINIC | Age: 82
End: 2022-01-01

## 2022-01-01 ENCOUNTER — LAB REQUISITION (OUTPATIENT)
Dept: LAB | Facility: HOSPITAL | Age: 82
End: 2022-01-01

## 2022-01-01 ENCOUNTER — TRANSCRIBE ORDERS (OUTPATIENT)
Dept: ADMINISTRATIVE | Facility: HOSPITAL | Age: 82
End: 2022-01-01

## 2022-01-01 VITALS
SYSTOLIC BLOOD PRESSURE: 144 MMHG | HEART RATE: 72 BPM | RESPIRATION RATE: 19 BRPM | TEMPERATURE: 98.4 F | OXYGEN SATURATION: 97 % | DIASTOLIC BLOOD PRESSURE: 58 MMHG

## 2022-01-01 VITALS
OXYGEN SATURATION: 95 % | HEART RATE: 75 BPM | SYSTOLIC BLOOD PRESSURE: 111 MMHG | DIASTOLIC BLOOD PRESSURE: 68 MMHG | TEMPERATURE: 97.4 F

## 2022-01-01 VITALS
OXYGEN SATURATION: 97 % | BODY MASS INDEX: 24.02 KG/M2 | TEMPERATURE: 98.2 F | SYSTOLIC BLOOD PRESSURE: 140 MMHG | WEIGHT: 158 LBS | HEART RATE: 88 BPM | DIASTOLIC BLOOD PRESSURE: 62 MMHG

## 2022-01-01 VITALS
BODY MASS INDEX: 25.31 KG/M2 | HEIGHT: 68 IN | SYSTOLIC BLOOD PRESSURE: 128 MMHG | OXYGEN SATURATION: 94 % | WEIGHT: 167 LBS | HEART RATE: 81 BPM | DIASTOLIC BLOOD PRESSURE: 58 MMHG

## 2022-01-01 VITALS
SYSTOLIC BLOOD PRESSURE: 140 MMHG | HEART RATE: 81 BPM | OXYGEN SATURATION: 97 % | TEMPERATURE: 98.3 F | DIASTOLIC BLOOD PRESSURE: 66 MMHG | RESPIRATION RATE: 18 BRPM

## 2022-01-01 VITALS
TEMPERATURE: 98.3 F | DIASTOLIC BLOOD PRESSURE: 56 MMHG | OXYGEN SATURATION: 97 % | HEART RATE: 62 BPM | SYSTOLIC BLOOD PRESSURE: 132 MMHG | RESPIRATION RATE: 16 BRPM

## 2022-01-01 VITALS
SYSTOLIC BLOOD PRESSURE: 134 MMHG | HEART RATE: 69 BPM | TEMPERATURE: 98.4 F | OXYGEN SATURATION: 99 % | RESPIRATION RATE: 18 BRPM | DIASTOLIC BLOOD PRESSURE: 60 MMHG

## 2022-01-01 VITALS
TEMPERATURE: 98.3 F | OXYGEN SATURATION: 97 % | HEART RATE: 71 BPM | RESPIRATION RATE: 18 BRPM | DIASTOLIC BLOOD PRESSURE: 60 MMHG | SYSTOLIC BLOOD PRESSURE: 132 MMHG

## 2022-01-01 VITALS
DIASTOLIC BLOOD PRESSURE: 69 MMHG | TEMPERATURE: 98.3 F | SYSTOLIC BLOOD PRESSURE: 122 MMHG | HEART RATE: 81 BPM | OXYGEN SATURATION: 97 %

## 2022-01-01 DIAGNOSIS — R41.3 MEMORY LOSS: ICD-10-CM

## 2022-01-01 DIAGNOSIS — T18.120D: ICD-10-CM

## 2022-01-01 DIAGNOSIS — R27.0 ATAXIA: Primary | ICD-10-CM

## 2022-01-01 DIAGNOSIS — R13.12 DYSPHAGIA, OROPHARYNGEAL PHASE: ICD-10-CM

## 2022-01-01 LAB
ALBUMIN SERPL-MCNC: 3.3 G/DL (ref 3.5–5.2)
ALBUMIN/GLOB SERPL: 1.1 G/DL
ALP SERPL-CCNC: 76 U/L (ref 39–117)
ALT SERPL W P-5'-P-CCNC: 33 U/L (ref 1–41)
ANION GAP SERPL CALCULATED.3IONS-SCNC: 10 MMOL/L (ref 5–15)
AST SERPL-CCNC: 25 U/L (ref 1–40)
BACTERIA SPEC AEROBE CULT: ABNORMAL
BACTERIA UR QL AUTO: ABNORMAL /HPF
BACTERIA UR QL AUTO: ABNORMAL /HPF
BASOPHILS # BLD AUTO: 0 10*3/MM3 (ref 0–0.2)
BASOPHILS NFR BLD AUTO: 0.4 % (ref 0–1.5)
BILIRUB SERPL-MCNC: 0.8 MG/DL (ref 0–1.2)
BILIRUB UR QL STRIP: NEGATIVE
BILIRUB UR QL STRIP: NEGATIVE
BUN SERPL-MCNC: 24 MG/DL (ref 8–23)
BUN/CREAT SERPL: 36.4 (ref 7–25)
CALCIUM SPEC-SCNC: 9.6 MG/DL (ref 8.6–10.5)
CHLORIDE SERPL-SCNC: 99 MMOL/L (ref 98–107)
CK MB SERPL-CCNC: 2.29 NG/ML
CLARITY UR: CLEAR
CLARITY UR: CLEAR
CO2 SERPL-SCNC: 27 MMOL/L (ref 22–29)
COLOR UR: ABNORMAL
COLOR UR: YELLOW
CREAT SERPL-MCNC: 0.66 MG/DL (ref 0.76–1.27)
DEPRECATED RDW RBC AUTO: 48.1 FL (ref 37–54)
EOSINOPHIL # BLD AUTO: 0 10*3/MM3 (ref 0–0.4)
EOSINOPHIL NFR BLD AUTO: 0.2 % (ref 0.3–6.2)
ERYTHROCYTE [DISTWIDTH] IN BLOOD BY AUTOMATED COUNT: 14.8 % (ref 12.3–15.4)
GFR SERPL CREATININE-BSD FRML MDRD: 116 ML/MIN/1.73
GLOBULIN UR ELPH-MCNC: 3 GM/DL
GLUCOSE SERPL-MCNC: 122 MG/DL (ref 65–99)
GLUCOSE UR STRIP-MCNC: NEGATIVE MG/DL
GLUCOSE UR STRIP-MCNC: NEGATIVE MG/DL
HCT VFR BLD AUTO: 35.5 % (ref 37.5–51)
HGB BLD-MCNC: 12.1 G/DL (ref 13–17.7)
HGB UR QL STRIP.AUTO: ABNORMAL
HGB UR QL STRIP.AUTO: NEGATIVE
HYALINE CASTS UR QL AUTO: ABNORMAL /LPF
HYALINE CASTS UR QL AUTO: ABNORMAL /LPF
KETONES UR QL STRIP: NEGATIVE
KETONES UR QL STRIP: NEGATIVE
LEUKOCYTE ESTERASE UR QL STRIP.AUTO: ABNORMAL
LEUKOCYTE ESTERASE UR QL STRIP.AUTO: ABNORMAL
LYMPHOCYTES # BLD AUTO: 0.9 10*3/MM3 (ref 0.7–3.1)
LYMPHOCYTES NFR BLD AUTO: 9 % (ref 19.6–45.3)
MAGNESIUM SERPL-MCNC: 1.9 MG/DL (ref 1.6–2.4)
MCH RBC QN AUTO: 31.8 PG (ref 26.6–33)
MCHC RBC AUTO-ENTMCNC: 33.9 G/DL (ref 31.5–35.7)
MCV RBC AUTO: 93.7 FL (ref 79–97)
MONOCYTES # BLD AUTO: 0.6 10*3/MM3 (ref 0.1–0.9)
MONOCYTES NFR BLD AUTO: 6.1 % (ref 5–12)
NEUTROPHILS NFR BLD AUTO: 8.5 10*3/MM3 (ref 1.7–7)
NEUTROPHILS NFR BLD AUTO: 84.3 % (ref 42.7–76)
NITRITE UR QL STRIP: NEGATIVE
NITRITE UR QL STRIP: POSITIVE
NRBC BLD AUTO-RTO: 0 /100 WBC (ref 0–0.2)
PH UR STRIP.AUTO: 5.5 [PH] (ref 5–8)
PH UR STRIP.AUTO: 7.5 [PH] (ref 5–8)
PHOSPHATE SERPL-MCNC: 2.4 MG/DL (ref 2.5–4.5)
PLATELET # BLD AUTO: 218 10*3/MM3 (ref 140–450)
PMV BLD AUTO: 10.6 FL (ref 6–12)
POTASSIUM SERPL-SCNC: 3.9 MMOL/L (ref 3.5–5.2)
PROT SERPL-MCNC: 6.3 G/DL (ref 6–8.5)
PROT UR QL STRIP: ABNORMAL
PROT UR QL STRIP: NEGATIVE
RBC # BLD AUTO: 3.79 10*6/MM3 (ref 4.14–5.8)
RBC # UR STRIP: ABNORMAL /HPF
RBC # UR STRIP: ABNORMAL /HPF
REF LAB TEST METHOD: ABNORMAL
REF LAB TEST METHOD: ABNORMAL
SODIUM SERPL-SCNC: 136 MMOL/L (ref 136–145)
SP GR UR STRIP: 1.02 (ref 1–1.03)
SP GR UR STRIP: 1.02 (ref 1–1.03)
SQUAMOUS #/AREA URNS HPF: ABNORMAL /HPF
SQUAMOUS #/AREA URNS HPF: ABNORMAL /HPF
TROPONIN T SERPL-MCNC: 0.02 NG/ML (ref 0–0.03)
URATE SERPL-MCNC: 2.4 MG/DL (ref 3.4–7)
UROBILINOGEN UR QL STRIP: ABNORMAL
UROBILINOGEN UR QL STRIP: ABNORMAL
WBC # UR STRIP: ABNORMAL /HPF
WBC # UR STRIP: ABNORMAL /HPF
WBC NRBC COR # BLD: 10.1 10*3/MM3 (ref 3.4–10.8)

## 2022-01-01 PROCEDURE — 87086 URINE CULTURE/COLONY COUNT: CPT | Performed by: INTERNAL MEDICINE

## 2022-01-01 PROCEDURE — G0299 HHS/HOSPICE OF RN EA 15 MIN: HCPCS

## 2022-01-01 PROCEDURE — 87186 SC STD MICRODIL/AGAR DIL: CPT | Performed by: INTERNAL MEDICINE

## 2022-01-01 PROCEDURE — 84100 ASSAY OF PHOSPHORUS: CPT | Performed by: INTERNAL MEDICINE

## 2022-01-01 PROCEDURE — 93294 REM INTERROG EVL PM/LDLS PM: CPT | Performed by: INTERNAL MEDICINE

## 2022-01-01 PROCEDURE — 83735 ASSAY OF MAGNESIUM: CPT | Performed by: INTERNAL MEDICINE

## 2022-01-01 PROCEDURE — G0153 HHCP-SVS OF S/L PATH,EA 15MN: HCPCS

## 2022-01-01 PROCEDURE — 85025 COMPLETE CBC W/AUTO DIFF WBC: CPT | Performed by: INTERNAL MEDICINE

## 2022-01-01 PROCEDURE — 84550 ASSAY OF BLOOD/URIC ACID: CPT | Performed by: INTERNAL MEDICINE

## 2022-01-01 PROCEDURE — 81001 URINALYSIS AUTO W/SCOPE: CPT | Performed by: INTERNAL MEDICINE

## 2022-01-01 PROCEDURE — 93296 REM INTERROG EVL PM/IDS: CPT | Performed by: INTERNAL MEDICINE

## 2022-01-01 PROCEDURE — 80053 COMPREHEN METABOLIC PANEL: CPT | Performed by: INTERNAL MEDICINE

## 2022-01-01 PROCEDURE — 84484 ASSAY OF TROPONIN QUANT: CPT | Performed by: INTERNAL MEDICINE

## 2022-01-01 PROCEDURE — 82553 CREATINE MB FRACTION: CPT | Performed by: INTERNAL MEDICINE

## 2022-01-01 PROCEDURE — 87077 CULTURE AEROBIC IDENTIFY: CPT | Performed by: INTERNAL MEDICINE

## 2022-01-06 ENCOUNTER — OFFICE (OUTPATIENT)
Dept: URBAN - METROPOLITAN AREA CLINIC 64 | Facility: CLINIC | Age: 82
End: 2022-01-06

## 2022-01-06 VITALS — SYSTOLIC BLOOD PRESSURE: 113 MMHG | HEIGHT: 71 IN | DIASTOLIC BLOOD PRESSURE: 58 MMHG | HEART RATE: 72 BPM

## 2022-01-06 DIAGNOSIS — R13.10 DYSPHAGIA, UNSPECIFIED: ICD-10-CM

## 2022-01-06 PROCEDURE — 99214 OFFICE O/P EST MOD 30 MIN: CPT | Performed by: INTERNAL MEDICINE

## 2022-01-06 RX ORDER — DOCUSATE SODIUM 50 MG/5ML
LIQUID ORAL
Qty: 600 | Refills: 6 | Status: ACTIVE
Start: 2022-01-06

## 2022-01-06 NOTE — TELEPHONE ENCOUNTER
Remote received, normal report, had 1 short duration of fast HR. Spoke to Dr Rodrigez, recommended daughter call  his PCP or goes to ER.

## 2022-01-18 ENCOUNTER — ON CAMPUS - OUTPATIENT (OUTPATIENT)
Dept: URBAN - METROPOLITAN AREA HOSPITAL 77 | Facility: HOSPITAL | Age: 82
End: 2022-01-18

## 2022-01-18 DIAGNOSIS — R63.4 ABNORMAL WEIGHT LOSS: ICD-10-CM

## 2022-01-18 DIAGNOSIS — R13.10 DYSPHAGIA, UNSPECIFIED: ICD-10-CM

## 2022-01-18 DIAGNOSIS — E43 UNSPECIFIED SEVERE PROTEIN-CALORIE MALNUTRITION: ICD-10-CM

## 2022-01-18 PROCEDURE — 43246 EGD PLACE GASTROSTOMY TUBE: CPT | Performed by: INTERNAL MEDICINE

## 2022-01-19 NOTE — HOME HEALTH
pt is being recerted for management of new gtube and rebolledo cath management    dx of dysphagia    pt also has shingles on his neck and chest

## 2022-01-20 NOTE — HOME HEALTH
pt sitting in recliner upon arrival. pt kept complaining of pain in his right side. sn assisted pt to the bathroom but he was unable to go. pt was taken back to his chair and was sat down. this went on for four more times. sn assisted wife with the pt on the bed and she gave him an enema. pt had a very large bm x 4. sn assisted wife and daughter to get the pt cleaned up. pt stated he was feeling much better after the bm.

## 2022-01-21 NOTE — TELEPHONE ENCOUNTER
Okay with restarting Eliquis as soon as possible.  Can take extra metoprolol half a tablet if the heart rate is more than 90.  However the blood pressure may get lower and may have to be watched.  Heart rates that he has are not life-threatening and can be observed rather than taking metoprolol.

## 2022-01-21 NOTE — TELEPHONE ENCOUNTER
Spoke with daughter, advised if HR gets close to 100 pt can take extra 1/2 tablet of metoprolol. Watch BP since he has had lower readings, if BP gets around 90/50 stop the 1/2 tablet.   Would like to set pt up with appointment in next couple weeks to f/u and do possible ekg.   Daughter advised she needs to look at schedule and speak with her mother and would call back Monday to make appointment.

## 2022-01-21 NOTE — TELEPHONE ENCOUNTER
"Patient's daughter, September, sent in vital readings concerned about patient's heart rate.     BP running 94/60 - 135/76  HR 80-97      Patient s/p feeding tube had to be off eliquis for 5 days, she states he is \"just not acting himself\"       September Walk # [phone]  "

## 2022-01-26 NOTE — HOME HEALTH
Patient is awake but confused and agitated. He c/o back pain and asks for help. Wife and granddaughter are present and pain meds are administered via G-tube. Patient is incoherent at times but clearly states he does not want to do any exercises. He verbalizes that he wants to go to bed and he wants someone to help his aching back. Patient is no longer a candidate for speech therapy. At this time, pt and his family want his pain to get under control. SLP discussed Hospice as a possible option for end of life care to focus on keeping patient comfortable and pain-free. Wife reports the family has discussed this option and are considering it. They would like for patient's nurse to come out to further discuss this with them on Thursday.  Patient declined to have his blood pressure or temperature taken.

## 2022-02-09 ENCOUNTER — TELEHEALTH PROVIDED IN PATIENT'S HOME (OUTPATIENT)
Dept: URBAN - METROPOLITAN AREA TELEHEALTH 4 | Facility: TELEHEALTH | Age: 82
End: 2022-02-09

## 2022-02-09 VITALS — HEIGHT: 71 IN

## 2022-02-09 DIAGNOSIS — K59.00 CONSTIPATION, UNSPECIFIED: ICD-10-CM

## 2022-02-09 DIAGNOSIS — R13.10 DYSPHAGIA, UNSPECIFIED: ICD-10-CM

## 2022-02-09 PROCEDURE — 99213 OFFICE O/P EST LOW 20 MIN: CPT | Mod: 95 | Performed by: INTERNAL MEDICINE

## 2022-02-15 NOTE — TELEPHONE ENCOUNTER
PT's Daughter called in and stated that PT's home health nurse started Propranolol for tremors due to Parkinson's. PT is currently taking Metoprolol. They are wondering if PT should be taking 2 beta blockers and are wanting your opinion on this change. Please advise, thank you!

## 2022-02-16 NOTE — TELEPHONE ENCOUNTER
PT's daughter called back, I advised her of your response. However she did want to make you aware that the Propranolol the APRN started the PT on is 20mg twice daily, the PT is currently only taking Metoprolol 12.5mg twice daily so she she just wanted to make sure it was OK for the dosage to be increased on the new med. Please advise. Thank you!

## (undated) DEVICE — PAPR PRNT PK SONY W RIBN UPC55

## (undated) DEVICE — PK ENDO GI 50

## (undated) DEVICE — BITEBLOCK ENDO W/STRAP 60F A/ LF DISP